# Patient Record
Sex: MALE | Race: WHITE | NOT HISPANIC OR LATINO
[De-identification: names, ages, dates, MRNs, and addresses within clinical notes are randomized per-mention and may not be internally consistent; named-entity substitution may affect disease eponyms.]

---

## 2014-04-07 RX ORDER — MONTELUKAST 4 MG/1
1 TABLET, CHEWABLE ORAL
Qty: 0 | Refills: 0 | COMMUNITY
Start: 2014-04-07

## 2017-03-13 ENCOUNTER — APPOINTMENT (OUTPATIENT)
Dept: PULMONOLOGY | Facility: CLINIC | Age: 72
End: 2017-03-13

## 2017-03-16 ENCOUNTER — RX RENEWAL (OUTPATIENT)
Age: 72
End: 2017-03-16

## 2017-03-30 ENCOUNTER — RX RENEWAL (OUTPATIENT)
Age: 72
End: 2017-03-30

## 2017-04-14 ENCOUNTER — APPOINTMENT (OUTPATIENT)
Dept: PULMONOLOGY | Facility: CLINIC | Age: 72
End: 2017-04-14

## 2017-04-14 VITALS
DIASTOLIC BLOOD PRESSURE: 80 MMHG | WEIGHT: 190 LBS | OXYGEN SATURATION: 96 % | RESPIRATION RATE: 17 BRPM | BODY MASS INDEX: 28.79 KG/M2 | SYSTOLIC BLOOD PRESSURE: 130 MMHG | HEIGHT: 68 IN | HEART RATE: 84 BPM

## 2017-04-17 ENCOUNTER — RX RENEWAL (OUTPATIENT)
Age: 72
End: 2017-04-17

## 2017-04-20 ENCOUNTER — APPOINTMENT (OUTPATIENT)
Dept: OTHER | Facility: CLINIC | Age: 72
End: 2017-04-20

## 2017-04-20 VITALS
DIASTOLIC BLOOD PRESSURE: 84 MMHG | HEART RATE: 63 BPM | SYSTOLIC BLOOD PRESSURE: 169 MMHG | BODY MASS INDEX: 29.7 KG/M2 | HEIGHT: 68 IN | OXYGEN SATURATION: 93 % | WEIGHT: 196 LBS

## 2017-04-20 DIAGNOSIS — Z87.891 PERSONAL HISTORY OF NICOTINE DEPENDENCE: ICD-10-CM

## 2017-04-21 LAB
ALBUMIN SERPL ELPH-MCNC: 4.4 G/DL
ALP BLD-CCNC: 107 U/L
ALT SERPL-CCNC: 23 U/L
ANION GAP SERPL CALC-SCNC: 17 MMOL/L
APPEARANCE: ABNORMAL
AST SERPL-CCNC: 23 U/L
BASOPHILS # BLD AUTO: 0.03 K/UL
BASOPHILS NFR BLD AUTO: 0.4 %
BILIRUB SERPL-MCNC: 0.2 MG/DL
BILIRUBIN URINE: NEGATIVE
BLOOD URINE: NEGATIVE
BUN SERPL-MCNC: 22 MG/DL
CALCIUM SERPL-MCNC: 9.1 MG/DL
CHLORIDE SERPL-SCNC: 101 MMOL/L
CHOLEST SERPL-MCNC: 138 MG/DL
CHOLEST/HDLC SERPL: 3.2 RATIO
CO2 SERPL-SCNC: 23 MMOL/L
COLOR: YELLOW
CREAT SERPL-MCNC: 1 MG/DL
EOSINOPHIL # BLD AUTO: 0.18 K/UL
EOSINOPHIL NFR BLD AUTO: 2.6 %
GLUCOSE QUALITATIVE U: NORMAL MG/DL
GLUCOSE SERPL-MCNC: 118 MG/DL
HCT VFR BLD CALC: 40.9 %
HDLC SERPL-MCNC: 43 MG/DL
HGB BLD-MCNC: 13.8 G/DL
IMM GRANULOCYTES NFR BLD AUTO: 0.1 %
KETONES URINE: NEGATIVE
LDLC SERPL CALC-MCNC: 81 MG/DL
LEUKOCYTE ESTERASE URINE: ABNORMAL
LYMPHOCYTES # BLD AUTO: 1.16 K/UL
LYMPHOCYTES NFR BLD AUTO: 16.7 %
MAN DIFF?: NORMAL
MCHC RBC-ENTMCNC: 31.9 PG
MCHC RBC-ENTMCNC: 33.7 GM/DL
MCV RBC AUTO: 94.7 FL
MONOCYTES # BLD AUTO: 0.52 K/UL
MONOCYTES NFR BLD AUTO: 7.5 %
NEUTROPHILS # BLD AUTO: 5.05 K/UL
NEUTROPHILS NFR BLD AUTO: 72.7 %
NITRITE URINE: NEGATIVE
PH URINE: 6
PLATELET # BLD AUTO: 165 K/UL
POTASSIUM SERPL-SCNC: 4.7 MMOL/L
PROT SERPL-MCNC: 7 G/DL
PROTEIN URINE: NEGATIVE MG/DL
RBC # BLD: 4.32 M/UL
RBC # FLD: 13.9 %
SODIUM SERPL-SCNC: 141 MMOL/L
SPECIFIC GRAVITY URINE: 1.03
TRIGL SERPL-MCNC: 72 MG/DL
UROBILINOGEN URINE: 1 MG/DL
WBC # FLD AUTO: 6.95 K/UL

## 2017-05-26 ENCOUNTER — RX RENEWAL (OUTPATIENT)
Age: 72
End: 2017-05-26

## 2017-06-15 ENCOUNTER — MEDICATION RENEWAL (OUTPATIENT)
Age: 72
End: 2017-06-15

## 2017-06-15 DIAGNOSIS — Z96.659 PRESENCE OF UNSPECIFIED ARTIFICIAL KNEE JOINT: ICD-10-CM

## 2017-06-19 ENCOUNTER — MEDICATION RENEWAL (OUTPATIENT)
Age: 72
End: 2017-06-19

## 2017-09-15 ENCOUNTER — RX RENEWAL (OUTPATIENT)
Age: 72
End: 2017-09-15

## 2017-10-09 ENCOUNTER — RX RENEWAL (OUTPATIENT)
Age: 72
End: 2017-10-09

## 2017-10-13 ENCOUNTER — APPOINTMENT (OUTPATIENT)
Dept: PULMONOLOGY | Facility: CLINIC | Age: 72
End: 2017-10-13
Payer: MEDICARE

## 2017-10-13 VITALS
SYSTOLIC BLOOD PRESSURE: 120 MMHG | HEIGHT: 68 IN | RESPIRATION RATE: 17 BRPM | DIASTOLIC BLOOD PRESSURE: 80 MMHG | HEART RATE: 65 BPM | OXYGEN SATURATION: 96 % | WEIGHT: 193 LBS | BODY MASS INDEX: 29.25 KG/M2

## 2017-10-13 DIAGNOSIS — Z23 ENCOUNTER FOR IMMUNIZATION: ICD-10-CM

## 2017-10-13 PROCEDURE — 99214 OFFICE O/P EST MOD 30 MIN: CPT | Mod: 25

## 2017-10-13 PROCEDURE — G0008: CPT

## 2017-10-13 PROCEDURE — 90662 IIV NO PRSV INCREASED AG IM: CPT

## 2017-10-13 PROCEDURE — 94010 BREATHING CAPACITY TEST: CPT

## 2017-10-13 PROCEDURE — 94729 DIFFUSING CAPACITY: CPT

## 2017-10-13 RX ORDER — AMOXICILLIN 500 MG/1
500 CAPSULE ORAL
Qty: 20 | Refills: 4 | Status: DISCONTINUED | COMMUNITY
Start: 2017-06-19 | End: 2017-10-13

## 2017-10-13 RX ORDER — AMOXICILLIN 500 MG/1
500 CAPSULE ORAL
Qty: 20 | Refills: 4 | Status: DISCONTINUED | COMMUNITY
Start: 2017-04-21 | End: 2017-10-13

## 2017-10-13 RX ORDER — AMLODIPINE BESYLATE 5 MG/1
5 TABLET ORAL
Refills: 0 | Status: ACTIVE | COMMUNITY

## 2017-10-13 RX ORDER — AMOXICILLIN 500 MG/1
500 CAPSULE ORAL
Qty: 20 | Refills: 4 | Status: DISCONTINUED | COMMUNITY
Start: 2017-06-15 | End: 2017-10-13

## 2017-10-30 ENCOUNTER — APPOINTMENT (OUTPATIENT)
Dept: THORACIC SURGERY | Facility: CLINIC | Age: 72
End: 2017-10-30
Payer: MEDICARE

## 2017-10-30 VITALS
HEIGHT: 68 IN | DIASTOLIC BLOOD PRESSURE: 80 MMHG | BODY MASS INDEX: 29.86 KG/M2 | OXYGEN SATURATION: 98 % | SYSTOLIC BLOOD PRESSURE: 184 MMHG | HEART RATE: 61 BPM | WEIGHT: 197 LBS

## 2017-10-30 PROCEDURE — 99205 OFFICE O/P NEW HI 60 MIN: CPT

## 2017-11-06 ENCOUNTER — APPOINTMENT (OUTPATIENT)
Dept: NUCLEAR MEDICINE | Facility: IMAGING CENTER | Age: 72
End: 2017-11-06

## 2017-11-06 ENCOUNTER — OUTPATIENT (OUTPATIENT)
Dept: OUTPATIENT SERVICES | Facility: HOSPITAL | Age: 72
LOS: 1 days | End: 2017-11-06

## 2017-11-06 VITALS
SYSTOLIC BLOOD PRESSURE: 144 MMHG | DIASTOLIC BLOOD PRESSURE: 70 MMHG | HEIGHT: 66.5 IN | WEIGHT: 190.04 LBS | TEMPERATURE: 97 F | HEART RATE: 70 BPM | OXYGEN SATURATION: 98 % | RESPIRATION RATE: 20 BRPM

## 2017-11-06 DIAGNOSIS — R91.1 SOLITARY PULMONARY NODULE: ICD-10-CM

## 2017-11-06 DIAGNOSIS — Z98.89 OTHER SPECIFIED POSTPROCEDURAL STATES: Chronic | ICD-10-CM

## 2017-11-06 DIAGNOSIS — R06.2 WHEEZING: ICD-10-CM

## 2017-11-06 DIAGNOSIS — M12.9 ARTHROPATHY, UNSPECIFIED: Chronic | ICD-10-CM

## 2017-11-06 DIAGNOSIS — I25.10 ATHEROSCLEROTIC HEART DISEASE OF NATIVE CORONARY ARTERY WITHOUT ANGINA PECTORIS: ICD-10-CM

## 2017-11-06 DIAGNOSIS — R91.8 OTHER NONSPECIFIC ABNORMAL FINDING OF LUNG FIELD: ICD-10-CM

## 2017-11-06 DIAGNOSIS — R06.83 SNORING: ICD-10-CM

## 2017-11-06 LAB
BLD GP AB SCN SERPL QL: NEGATIVE — SIGNIFICANT CHANGE UP
BUN SERPL-MCNC: 15 MG/DL — SIGNIFICANT CHANGE UP (ref 7–23)
CALCIUM SERPL-MCNC: 8.9 MG/DL — SIGNIFICANT CHANGE UP (ref 8.4–10.5)
CHLORIDE SERPL-SCNC: 102 MMOL/L — SIGNIFICANT CHANGE UP (ref 98–107)
CO2 SERPL-SCNC: 28 MMOL/L — SIGNIFICANT CHANGE UP (ref 22–31)
CREAT SERPL-MCNC: 0.94 MG/DL — SIGNIFICANT CHANGE UP (ref 0.5–1.3)
GLUCOSE SERPL-MCNC: 119 MG/DL — HIGH (ref 70–99)
HCT VFR BLD CALC: 41.9 % — SIGNIFICANT CHANGE UP (ref 39–50)
HGB BLD-MCNC: 14.6 G/DL — SIGNIFICANT CHANGE UP (ref 13–17)
MCHC RBC-ENTMCNC: 31.3 PG — SIGNIFICANT CHANGE UP (ref 27–34)
MCHC RBC-ENTMCNC: 34.8 % — SIGNIFICANT CHANGE UP (ref 32–36)
MCV RBC AUTO: 89.9 FL — SIGNIFICANT CHANGE UP (ref 80–100)
NRBC # FLD: 0 — SIGNIFICANT CHANGE UP
PLATELET # BLD AUTO: 169 K/UL — SIGNIFICANT CHANGE UP (ref 150–400)
PMV BLD: 12.3 FL — SIGNIFICANT CHANGE UP (ref 7–13)
POTASSIUM SERPL-MCNC: 4 MMOL/L — SIGNIFICANT CHANGE UP (ref 3.5–5.3)
POTASSIUM SERPL-SCNC: 4 MMOL/L — SIGNIFICANT CHANGE UP (ref 3.5–5.3)
RBC # BLD: 4.66 M/UL — SIGNIFICANT CHANGE UP (ref 4.2–5.8)
RBC # FLD: 12.9 % — SIGNIFICANT CHANGE UP (ref 10.3–14.5)
RH IG SCN BLD-IMP: NEGATIVE — SIGNIFICANT CHANGE UP
SODIUM SERPL-SCNC: 140 MMOL/L — SIGNIFICANT CHANGE UP (ref 135–145)
WBC # BLD: 6.25 K/UL — SIGNIFICANT CHANGE UP (ref 3.8–10.5)
WBC # FLD AUTO: 6.25 K/UL — SIGNIFICANT CHANGE UP (ref 3.8–10.5)

## 2017-11-06 RX ORDER — LOSARTAN POTASSIUM 100 MG/1
1 TABLET, FILM COATED ORAL
Qty: 0 | Refills: 0 | COMMUNITY

## 2017-11-06 RX ORDER — FLUTICASONE PROPIONATE AND SALMETEROL 50; 250 UG/1; UG/1
1 POWDER ORAL; RESPIRATORY (INHALATION)
Qty: 0 | Refills: 0 | COMMUNITY

## 2017-11-06 RX ORDER — SERTRALINE 25 MG/1
1 TABLET, FILM COATED ORAL
Qty: 0 | Refills: 0 | COMMUNITY

## 2017-11-06 RX ORDER — AMLODIPINE BESYLATE 2.5 MG/1
1 TABLET ORAL
Qty: 0 | Refills: 0 | COMMUNITY

## 2017-11-06 RX ORDER — OMEPRAZOLE 10 MG/1
1 CAPSULE, DELAYED RELEASE ORAL
Qty: 0 | Refills: 0 | COMMUNITY

## 2017-11-06 NOTE — H&P PST ADULT - PROBLEM SELECTOR PLAN 1
This is a 71 y/o male who is scheduled for left VATS, wedge resection, possible lobectomy on 11-14-17  * Given scrub cleanser  * Given pre op famotidine  * Instructed to start aspirin 81 mg orally in the morning starting 11-6-17 due to h/o cardiac stents  * Instructed to take normal am dose of losartan, omeprazole, sertraline, amlodipine and aspirin the am of surgery

## 2017-11-06 NOTE — H&P PST ADULT - NSANTHOSAYNRD_GEN_A_CORE
No. JEZ screening performed.  STOP BANG Legend: 0-2 = LOW Risk; 3-4 = INTERMEDIATE Risk; 5-8 = HIGH Risk

## 2017-11-06 NOTE — H&P PST ADULT - LYMPHATIC
posterior cervical R/supraclavicular R/anterior cervical L/posterior cervical L/supraclavicular L/anterior cervical R

## 2017-11-06 NOTE — H&P PST ADULT - HISTORY OF PRESENT ILLNESS
This is a 73 y/o male who presents with annual CT scan due to h/o asbestos exposure and patient was a [employer] worker. Abnormality noted on CT scan. Further work up included left VATS, wedge resection, possible lobectomy on 11-14-17 This is a 71 y/o male who presents with annual CT scan due to h/o asbestos exposure and patient was a [employer] [facility] worker. Has h/o lung blebs in 2002 with subsequent pleurodesis Abnormality noted on CT scan. Further work up included left VATS, wedge resection, possible lobectomy on 11-14-17 This is a 71 y/o male who is a very poor historian (if his medications were not in the computer from prior hospitalization, patient would have failed to tell PAST NP that he had been on Advair and montelukast, which he stopped on his own 6-8 months ago and on medication for elevated cholesterol). 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 Has h/o lung blebs in 2002 with subsequent pleurodesis Abnormality noted on CT scan. Further work up included left VATS, wedge resection, possible lobectomy on 11-14-17

## 2017-11-06 NOTE — H&P PST ADULT - CARDIOVASCULAR COMMENTS
can climb stairs without SOB -- 4 METS; However, patient c/o intermittent SOB with weather change ("last winter")

## 2017-11-06 NOTE — H&P PST ADULT - PMH
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

## 2017-11-06 NOTE — H&P PST ADULT - ACTIVITY
can climb stairs without SOB -- NOTE: depending on whether with asthma, c/o intermittent SOB and intermittent wheezing ("last winter") can climb stairs without SOB -- NOTE: depending on weather changes with h/o asthma, patient  c/o intermittent SOB and intermittent wheezing ("last time last winter")

## 2017-11-06 NOTE — H&P PST ADULT - PROBLEM SELECTOR PLAN 3
Await cardiac clearance with cardiologist pre-arranged prior to PAST office  * Await old ekg for comparison, recent stress test and echo from cardiologist Await cardiac clearance with cardiologist pre-arranged prior to PAST office  * Await old ekg for comparison, recent stress test and echo from cardiologist  * Need to notify surgeon of pre op cardiac clearance -- spoke to AYESHA Em in surgeon's office

## 2017-11-06 NOTE — H&P PST ADULT - PSH
Arthropathy  left knee replacement in 2014  Lung Blebs  s/p pleuradesis in 2002  S/P angioplasty with stent  2004 RCA and 2nd stent approximately 2014 or 2015  S/P Hernia Surgery    S/P left knee arthroscopy  2009 Arthropathy  left knee replacement in 2014  Lung Blebs  s/p pleurodesis in 2002  S/P angioplasty with stent  2004 RCA and 2nd stent approximately 2014 or 2015  S/P Hernia Surgery    S/P left knee arthroscopy  2009

## 2017-11-06 NOTE — H&P PST ADULT - OTHER CARE PROVIDERS
pulmonologist, Dr. Martell; cardiologist, Dr. Georges Vergara pulmonologist, Dr. Martell  [phone]; cardiologist, Dr. Georges Vergara--patient to call with phone number

## 2017-11-06 NOTE — H&P PST ADULT - RESPIRATORY AND THORAX COMMENTS
c/o intermittent wheezing ("last winter") and can climb stairs without SOB -- 4 METS; However, patient c/o intermittent SOB with weather change ("last winter"); left lung mass -- stopped advair and montelukast for asthma 6-8 months ago ON HIS OWN and MD DEAN

## 2017-11-06 NOTE — H&P PST ADULT - PROBLEM SELECTOR PLAN 4
Await pulmonary clearance with pulmonologist due to fact that patient c/o intermittent wheezing ("last winter") and can climb stairs without SOB -- 4 METS; However, patient c/o intermittent SOB with weather change ("last winter") -- stopped Advair and montelukast for asthma 6-8 months ago ON HIS OWN and MD UNAWARE  * Need to notify surgeon of pre op pulmonary clearance Await pulmonary clearance with pulmonologist due to fact that patient c/o intermittent wheezing ("last winter") and can climb stairs without SOB -- 4 METS; However, patient c/o intermittent SOB with weather change ("last winter") -- stopped Advair and montelukast for asthma 6-8 months ago ON HIS OWN and MD UNAWARE  (NOTE: patient is a very poor historian (if his medications were not in the computer from prior hospitalization, patient would have failed to tell PAST NP that he had been on Advair and montelukast, which he stopped on his own 6-8 months ago,  and on medication for elevated cholesterol).   * Need to notify surgeon of pre op pulmonary clearance Await pulmonary clearance with pulmonologist due to fact that patient c/o intermittent wheezing ("last winter") and can climb stairs without SOB -- 4 METS; However, patient c/o intermittent SOB with weather change ("last winter") -- stopped Advair and montelukast for asthma 6-8 months ago ON HIS OWN and MD UNAWARE  (NOTE: patient is a very poor historian (if his medications were not in the computer from prior hospitalization, patient would have failed to tell PAST NP that he had been on Advair and montelukast, which he stopped on his own 6-8 months ago,  and on medication for elevated cholesterol).   * Need to notify surgeon of pre op pulmonary clearance--spoke to AYESHA Em in surgeon's office

## 2017-11-10 ENCOUNTER — APPOINTMENT (OUTPATIENT)
Dept: PULMONOLOGY | Facility: CLINIC | Age: 72
End: 2017-11-10

## 2017-11-14 ENCOUNTER — APPOINTMENT (OUTPATIENT)
Dept: THORACIC SURGERY | Facility: HOSPITAL | Age: 72
End: 2017-11-14

## 2017-11-30 ENCOUNTER — OTHER (OUTPATIENT)
Age: 72
End: 2017-11-30

## 2018-01-07 ENCOUNTER — RX RENEWAL (OUTPATIENT)
Age: 73
End: 2018-01-07

## 2018-01-10 ENCOUNTER — RX RENEWAL (OUTPATIENT)
Age: 73
End: 2018-01-10

## 2018-01-19 ENCOUNTER — APPOINTMENT (OUTPATIENT)
Dept: PULMONOLOGY | Facility: CLINIC | Age: 73
End: 2018-01-19
Payer: MEDICARE

## 2018-01-19 VITALS
HEART RATE: 80 BPM | SYSTOLIC BLOOD PRESSURE: 130 MMHG | BODY MASS INDEX: 27.28 KG/M2 | OXYGEN SATURATION: 97 % | HEIGHT: 68 IN | WEIGHT: 180 LBS | DIASTOLIC BLOOD PRESSURE: 80 MMHG

## 2018-01-19 PROCEDURE — 99214 OFFICE O/P EST MOD 30 MIN: CPT | Mod: 25

## 2018-01-19 PROCEDURE — 94010 BREATHING CAPACITY TEST: CPT

## 2018-01-31 ENCOUNTER — RX RENEWAL (OUTPATIENT)
Age: 73
End: 2018-01-31

## 2018-04-02 ENCOUNTER — RX RENEWAL (OUTPATIENT)
Age: 73
End: 2018-04-02

## 2018-04-09 ENCOUNTER — RX RENEWAL (OUTPATIENT)
Age: 73
End: 2018-04-09

## 2018-04-13 ENCOUNTER — APPOINTMENT (OUTPATIENT)
Dept: PULMONOLOGY | Facility: CLINIC | Age: 73
End: 2018-04-13
Payer: COMMERCIAL

## 2018-04-13 VITALS
DIASTOLIC BLOOD PRESSURE: 80 MMHG | HEART RATE: 71 BPM | SYSTOLIC BLOOD PRESSURE: 120 MMHG | BODY MASS INDEX: 28.64 KG/M2 | OXYGEN SATURATION: 97 % | HEIGHT: 68 IN | WEIGHT: 189 LBS | RESPIRATION RATE: 17 BRPM

## 2018-04-13 PROCEDURE — 99214 OFFICE O/P EST MOD 30 MIN: CPT | Mod: 25

## 2018-04-13 PROCEDURE — 94010 BREATHING CAPACITY TEST: CPT

## 2018-04-13 PROCEDURE — 94729 DIFFUSING CAPACITY: CPT

## 2018-04-13 RX ORDER — ALBUTEROL SULFATE 2.5 MG/3ML
(2.5 MG/3ML) SOLUTION RESPIRATORY (INHALATION)
Qty: 120 | Refills: 2 | Status: ACTIVE | COMMUNITY
Start: 2018-04-13 | End: 1900-01-01

## 2018-04-18 ENCOUNTER — APPOINTMENT (OUTPATIENT)
Dept: OTHER | Facility: CLINIC | Age: 73
End: 2018-04-18
Payer: COMMERCIAL

## 2018-04-18 VITALS
SYSTOLIC BLOOD PRESSURE: 163 MMHG | BODY MASS INDEX: 28.79 KG/M2 | OXYGEN SATURATION: 97 % | HEART RATE: 67 BPM | DIASTOLIC BLOOD PRESSURE: 81 MMHG | RESPIRATION RATE: 16 BRPM | WEIGHT: 190 LBS | HEIGHT: 68 IN

## 2018-04-18 VITALS — SYSTOLIC BLOOD PRESSURE: 140 MMHG | DIASTOLIC BLOOD PRESSURE: 60 MMHG

## 2018-04-18 DIAGNOSIS — Z03.89 ENCOUNTER FOR OBSERVATION FOR OTHER SUSPECTED DISEASES AND CONDITIONS RULED OUT: ICD-10-CM

## 2018-04-18 PROCEDURE — 99214 OFFICE O/P EST MOD 30 MIN: CPT | Mod: 25

## 2018-04-18 PROCEDURE — 96150: CPT

## 2018-04-18 PROCEDURE — 94010 BREATHING CAPACITY TEST: CPT

## 2018-04-18 PROCEDURE — 99397 PER PM REEVAL EST PAT 65+ YR: CPT

## 2018-04-18 RX ORDER — OLOPATADINE HYDROCHLORIDE 665 UG/1
0.6 SPRAY, METERED NASAL
Qty: 3 | Refills: 1 | Status: COMPLETED | COMMUNITY
Start: 2017-04-14 | End: 2018-04-18

## 2018-04-18 RX ORDER — MONTELUKAST SODIUM 10 MG/1
10 TABLET, FILM COATED ORAL
Qty: 1 | Refills: 1 | Status: COMPLETED | COMMUNITY
Start: 2017-04-14 | End: 2018-04-18

## 2018-04-18 RX ORDER — MODAFINIL 200 MG/1
200 TABLET ORAL
Qty: 30 | Refills: 5 | Status: COMPLETED | COMMUNITY
Start: 2017-04-14 | End: 2018-04-18

## 2018-04-19 ENCOUNTER — APPOINTMENT (OUTPATIENT)
Dept: PULMONOLOGY | Facility: CLINIC | Age: 73
End: 2018-04-19

## 2018-04-19 LAB
ALBUMIN SERPL ELPH-MCNC: 4.2 G/DL
ALP BLD-CCNC: 116 U/L
ALT SERPL-CCNC: 23 U/L
ANION GAP SERPL CALC-SCNC: 15 MMOL/L
APPEARANCE: CLEAR
AST SERPL-CCNC: 17 U/L
BASOPHILS # BLD AUTO: 0.03 K/UL
BASOPHILS NFR BLD AUTO: 0.4 %
BILIRUB SERPL-MCNC: 0.2 MG/DL
BILIRUBIN URINE: NEGATIVE
BLOOD URINE: NEGATIVE
BUN SERPL-MCNC: 15 MG/DL
CALCIUM SERPL-MCNC: 9.1 MG/DL
CHLORIDE SERPL-SCNC: 104 MMOL/L
CHOLEST SERPL-MCNC: 147 MG/DL
CHOLEST/HDLC SERPL: 3.5 RATIO
CO2 SERPL-SCNC: 26 MMOL/L
COLOR: YELLOW
CREAT SERPL-MCNC: 0.88 MG/DL
EOSINOPHIL # BLD AUTO: 0.23 K/UL
EOSINOPHIL NFR BLD AUTO: 3 %
GLUCOSE QUALITATIVE U: NEGATIVE MG/DL
GLUCOSE SERPL-MCNC: 122 MG/DL
HCT VFR BLD CALC: 42 %
HDLC SERPL-MCNC: 42 MG/DL
HGB BLD-MCNC: 13.5 G/DL
IMM GRANULOCYTES NFR BLD AUTO: 0.1 %
KETONES URINE: NEGATIVE
LDLC SERPL CALC-MCNC: 87 MG/DL
LEUKOCYTE ESTERASE URINE: NEGATIVE
LYMPHOCYTES # BLD AUTO: 1.28 K/UL
LYMPHOCYTES NFR BLD AUTO: 16.9 %
MAN DIFF?: NORMAL
MCHC RBC-ENTMCNC: 30.6 PG
MCHC RBC-ENTMCNC: 32.1 GM/DL
MCV RBC AUTO: 95.2 FL
MONOCYTES # BLD AUTO: 0.71 K/UL
MONOCYTES NFR BLD AUTO: 9.4 %
NEUTROPHILS # BLD AUTO: 5.3 K/UL
NEUTROPHILS NFR BLD AUTO: 70.2 %
NITRITE URINE: NEGATIVE
PH URINE: 6
PLATELET # BLD AUTO: 192 K/UL
POTASSIUM SERPL-SCNC: 5.3 MMOL/L
PROT SERPL-MCNC: 7.4 G/DL
PROTEIN URINE: NEGATIVE MG/DL
RBC # BLD: 4.41 M/UL
RBC # FLD: 13.6 %
SODIUM SERPL-SCNC: 145 MMOL/L
SPECIFIC GRAVITY URINE: 1.01
TRIGL SERPL-MCNC: 92 MG/DL
UROBILINOGEN URINE: NEGATIVE MG/DL
WBC # FLD AUTO: 7.56 K/UL

## 2018-07-10 ENCOUNTER — RX RENEWAL (OUTPATIENT)
Age: 73
End: 2018-07-10

## 2018-07-16 PROBLEM — R06.83 SNORING: Chronic | Status: ACTIVE | Noted: 2017-11-06

## 2018-07-17 ENCOUNTER — APPOINTMENT (OUTPATIENT)
Dept: PULMONOLOGY | Facility: CLINIC | Age: 73
End: 2018-07-17
Payer: MEDICARE

## 2018-07-17 ENCOUNTER — NON-APPOINTMENT (OUTPATIENT)
Age: 73
End: 2018-07-17

## 2018-07-17 VITALS
WEIGHT: 194 LBS | OXYGEN SATURATION: 96 % | DIASTOLIC BLOOD PRESSURE: 80 MMHG | SYSTOLIC BLOOD PRESSURE: 120 MMHG | HEART RATE: 111 BPM | HEIGHT: 68 IN | BODY MASS INDEX: 29.4 KG/M2

## 2018-07-17 PROBLEM — Z77.090 CONTACT WITH AND (SUSPECTED) EXPOSURE TO ASBESTOS: Chronic | Status: ACTIVE | Noted: 2017-11-06

## 2018-07-17 PROBLEM — F32.9 MAJOR DEPRESSIVE DISORDER, SINGLE EPISODE, UNSPECIFIED: Chronic | Status: ACTIVE | Noted: 2017-11-06

## 2018-07-17 PROBLEM — R91.8 OTHER NONSPECIFIC ABNORMAL FINDING OF LUNG FIELD: Chronic | Status: ACTIVE | Noted: 2017-11-06

## 2018-07-17 PROCEDURE — 99214 OFFICE O/P EST MOD 30 MIN: CPT | Mod: 25

## 2018-07-17 PROCEDURE — 94010 BREATHING CAPACITY TEST: CPT | Mod: 59

## 2018-07-17 PROCEDURE — 94618 PULMONARY STRESS TESTING: CPT

## 2018-09-23 ENCOUNTER — RX RENEWAL (OUTPATIENT)
Age: 73
End: 2018-09-23

## 2018-10-12 ENCOUNTER — RX RENEWAL (OUTPATIENT)
Age: 73
End: 2018-10-12

## 2018-10-16 ENCOUNTER — RX RENEWAL (OUTPATIENT)
Age: 73
End: 2018-10-16

## 2018-12-14 ENCOUNTER — APPOINTMENT (OUTPATIENT)
Dept: PULMONOLOGY | Facility: CLINIC | Age: 73
End: 2018-12-14
Payer: MEDICARE

## 2018-12-14 ENCOUNTER — NON-APPOINTMENT (OUTPATIENT)
Age: 73
End: 2018-12-14

## 2018-12-14 VITALS
SYSTOLIC BLOOD PRESSURE: 130 MMHG | OXYGEN SATURATION: 97 % | HEART RATE: 105 BPM | RESPIRATION RATE: 17 BRPM | HEIGHT: 65 IN | WEIGHT: 193 LBS | DIASTOLIC BLOOD PRESSURE: 60 MMHG | BODY MASS INDEX: 32.15 KG/M2

## 2018-12-14 PROCEDURE — 94010 BREATHING CAPACITY TEST: CPT

## 2018-12-14 PROCEDURE — 99214 OFFICE O/P EST MOD 30 MIN: CPT | Mod: 25

## 2018-12-14 NOTE — REASON FOR VISIT
[Follow-Up] : a follow-up visit [FreeTextEntry1] : abnormal chest CT, allergic rhinitis, asbestos exposure, asthma, COPD, esophageal reflux, non-small cell cancer of left lung, JEZ, chronic rhinitis and SOB

## 2018-12-14 NOTE — HISTORY OF PRESENT ILLNESS
[FreeTextEntry1] : Mr. St is a 73 year old male with a history of abnormal chest CT, allergic rhinitis, asbestos exposure, asthma, COPD, esophageal reflux, non-small cell cancer of left lung, JEZ, chronic rhinitis and SOB presenting to the office today for a follow up visit. His chief complaint is sinus issues. \par - He comes in stating that he not been good. He has been experiencing dysphonia for the past 3 months \par - His head has been "clogged" and he has been having nasal congestion\par - He has itchy eyes\par - He has been getting between 7-9 hours of sleep.\par - His weight has been stable\par - He has not been exercising as much as he would like\par - he believes that his congestion was brought on by riding his bike outside\par - He notes wheezing sometimes. \par - His energy level has been medium to low. \par - he was placed on 20 mg of prednisone for 7 days \par - He notes that ever since his prior ear issues and receiving treatment, he has been having a clogged ear. \par - He denies any headaches, nausea, vomiting, fever, chills, sweats, chest pain, chest pressure, palpitations, SOB, coughing, fatigue, diarrhea, constipation, dysphagia, myalgias, dizziness, leg swelling, leg pain, itchy ears, heartburn, reflux, or sour taste in the mouth.

## 2018-12-14 NOTE — ADDENDUM
[FreeTextEntry1] : Documented by Kip Zhao acting as a scribe for Dr. Vitor Martell on 12/14/18\par \par All medical record entries made by the Scribe were at my, Dr. Vitor Martell's, direction and personally dictated by me on 12/14/18. I have reviewed the chart and agree that the record accurately reflects my personal performance of the history, physical exam, assessment and plan. I have also personally directed, reviewed, and agree with the discharge instructions. \par \par \par \par \par

## 2018-12-14 NOTE — PROCEDURE
[FreeTextEntry1] : He had a CT chest scan performed on 7/26/2018, which showed that the previously seen left lower lobe nodule has been resected and that there are postoperative changes. There is a mild degree of emphysema. There is minimal apical pulmonary scarring, worse on the right; atelectasis and scarring in the left lower lobe. There is left pleural thickening. \par \par PFT- spi reveals mild obstructive dysfunction; FEV1 was 2.15L which is 83% of predicted; normal flow volume loop \par

## 2018-12-14 NOTE — ASSESSMENT
[FreeTextEntry1] : Mr. St is s/p lung resection c/w primary lung cancer, likely asbestos related. He is currently suffering from ENT/ sinus/ throat issues. \par \par His SOB is multifactorial:\par -overweight \par -asthma \par -COPD \par -asbestos exposure \par -cardiac disease \par -poor mechanics of breathing\par -s/p lobectomy \par \par Problem 1: lung cancer\par -s/p lung resection c/w primary lung cancer\par -likely asbestos related\par -f/u chest CT 4 times/year [id number], 2 times/year x3 years (due now, then 3-4 months) \par -f/u chest CT March 2019\par \par Problem 2: asthma\par -continue Albuterol via nebulizer Q6H\par -continue Advair 250 BID\par -continue Combivent PRN Q6H\par - Continue Singulair 10 mg QD at bed\par \par -Inhaler technique reviewed as well as oral hygiene techniques reviewed with patient. Avoidance of cold air, extremes of temperature, rescue inhaler should be used before exercise. Order of medication reviewed with patient. Recommended use of a cool mist humidifier in the bedroom. \par \par Asthma is believed to be caused by inherited (genetic) and environmental factor, but its exact cause is unknown. Asthma may be triggered by allergens, lung infections, or irritants in the air. Asthma triggers are different for each person.\par \par Problem 3: GERD\par -add Zantac 300 QHS\par -continue Omeprazole 40 mg QD in the morning \par -Things to avoid including overeating, spicy foods, tight clothing, eating within three hours of bed, this list is not all inclusive. \par -For treatment of reflux, possible options discussed including diet control, H2 blockers, PPIs, as well as coating motility agents discussed as treatment options. Timing of meals and proximity of last meal to sleep were discussed. If symptoms persist, a formal gastrointestinal evaluation is needed.\par \par Problem : Sinusitis\par - Add Augmentin 875 mg BID\par - Add Qnasal \par - Add Nystatin 10 mg q8H\par \par Problem 4: allergy/sinus\par -continue Clarinex 5mg QHS\par -continue olopatadine 0.6% 1 sniff each nostril BID\par \par Environmental measures for allergies were encouraged including mattress and pillow cover, air purifier, and environmental controls.\par \par Problem 5: overweight\par -Weight loss, exercise, and diet control were discussed and are highly encouraged. Treatment options were given such as, aqua therapy, and contacting a nutritionist. Recommended to use the elliptical, stationary bike, less use of treadmill.  Obesity is associated with worsening asthma, shortness of breath, and potential for cardiac disease, diabetes, and other underlying medical conditions. \par \par Problem 6: OSAS\par -recommended Oxy-Aid\par -Oral appliance fitting due him not tolerating the face mask. F/u sleep study with the appliance (Home) \par -continue Provigil 200 mg QD (ISTOP Checked)\par -Discussed the risks/associations with coronary artery disease, atrial fibrillation, arrhythmia, memory loss, issues with concentration, stroke risk, hypertension, nocturia, chronic reflux/Bray’s esophagus some but not all inclusive. Treatment options discussed including CPAP/BiPAP machine, oral appliance, ProVent therapy, Oxy-Aid by [employer], new technologies, or positional sleep. \par \par Problem 7: health maintenance\par -received influenza vaccine 2018\par -recommended strep pneumonia vaccines: Prevnar-13 vaccine, followed by Pneumo vaccine 23 on year following\par -recommended early intervention for URIs\par -recommended osteoporosis evaluations\par -recommended early dermatological evaluations\par -recommended after the age of 50 to the age of 70, colonoscopy every 5 years\par \par Follow up in 3-4 months.\par The patient was encouraged to call with any changes, concerns, or questions.

## 2018-12-18 ENCOUNTER — FORM ENCOUNTER (OUTPATIENT)
Age: 73
End: 2018-12-18

## 2018-12-21 ENCOUNTER — APPOINTMENT (OUTPATIENT)
Dept: PSYCHIATRY | Facility: CLINIC | Age: 73
End: 2018-12-21
Payer: COMMERCIAL

## 2018-12-21 PROCEDURE — 99205 OFFICE O/P NEW HI 60 MIN: CPT

## 2019-02-25 ENCOUNTER — RX RENEWAL (OUTPATIENT)
Age: 74
End: 2019-02-25

## 2019-03-15 ENCOUNTER — APPOINTMENT (OUTPATIENT)
Dept: PSYCHIATRY | Facility: CLINIC | Age: 74
End: 2019-03-15
Payer: COMMERCIAL

## 2019-03-15 PROCEDURE — 99214 OFFICE O/P EST MOD 30 MIN: CPT

## 2019-04-15 ENCOUNTER — APPOINTMENT (OUTPATIENT)
Dept: PULMONOLOGY | Facility: CLINIC | Age: 74
End: 2019-04-15
Payer: MEDICARE

## 2019-04-15 VITALS
RESPIRATION RATE: 16 BRPM | BODY MASS INDEX: 32.65 KG/M2 | OXYGEN SATURATION: 97 % | DIASTOLIC BLOOD PRESSURE: 60 MMHG | WEIGHT: 196 LBS | SYSTOLIC BLOOD PRESSURE: 140 MMHG | HEIGHT: 65 IN | HEART RATE: 69 BPM

## 2019-04-15 PROCEDURE — 94010 BREATHING CAPACITY TEST: CPT

## 2019-04-15 PROCEDURE — 99214 OFFICE O/P EST MOD 30 MIN: CPT | Mod: 25

## 2019-04-15 PROCEDURE — 94729 DIFFUSING CAPACITY: CPT

## 2019-04-15 NOTE — PROCEDURE
[FreeTextEntry1] : PFT- spi reveals mild to moderate obstructive dysfunction; FEV1 was 2.31L which is 79% of predicted; normal diffusion at 30.3, which is 145% of predicted; normal flow volume loop

## 2019-04-15 NOTE — ADDENDUM
[FreeTextEntry1] : Documented by Kip Zhao acting as a scribe for Dr. Vitor Martell on 4/15/2019\par \par All medical record entries made by the Scribe were at my, Dr. Vitor Martell's, direction and personally dictated by me on 4/15/2019. I have reviewed the chart and agree that the record accurately reflects my personal performance of the history, physical exam, assessment and plan. I have also personally directed, reviewed, and agree with the discharge instructions. \par \par \par \par \par

## 2019-04-15 NOTE — HISTORY OF PRESENT ILLNESS
[FreeTextEntry1] : Mr. St is a 73 year old male with a history of abnormal chest CT, allergic rhinitis, asbestos exposure, asthma, COPD, esophageal reflux, non-small cell cancer of left lung, JEZ, chronic rhinitis and SOB presenting to the office today for a follow up visit. His chief complaint is low energy\par - He comes in stating that he feels generally well \par - He describes his energy level as medium\par - While he does not have diarrhea, he has been having loose stool for the last year. \par - his energy level is 6/10\par - He has been having a lot of itchy ears (inside the ear)\par - He has been tying to go the gym up to 3 times per week. \par - He states that he sleeps well. \par - He denies any headaches, nausea, vomiting, fever, chills, sweats, chest pain, chest pressure, palpitations, SOB, coughing, wheezing, diarrhea, constipation, dysphagia, myalgias, dizziness, leg swelling, leg pain, itchy eyes, itchy ears, heartburn, reflux, or sour taste in the mouth.

## 2019-04-15 NOTE — PHYSICAL EXAM
[Normal Appearance] : normal appearance [General Appearance - Well Developed] : well developed [Well Groomed] : well groomed [General Appearance - Well Nourished] : well nourished [No Deformities] : no deformities [General Appearance - In No Acute Distress] : no acute distress [Normal Conjunctiva] : the conjunctiva exhibited no abnormalities [Eyelids - No Xanthelasma] : the eyelids demonstrated no xanthelasmas [Normal Oropharynx] : normal oropharynx [Neck Cervical Mass (___cm)] : no neck mass was observed [Neck Appearance] : the appearance of the neck was normal [Jugular Venous Distention Increased] : there was no jugular-venous distention [Thyroid Diffuse Enlargement] : the thyroid was not enlarged [Thyroid Nodule] : there were no palpable thyroid nodules [Heart Rate And Rhythm] : heart rate and rhythm were normal [Heart Sounds] : normal S1 and S2 [Murmurs] : no murmurs present [Respiration, Rhythm And Depth] : normal respiratory rhythm and effort [Auscultation Breath Sounds / Voice Sounds] : lungs were clear to auscultation bilaterally [Exaggerated Use Of Accessory Muscles For Inspiration] : no accessory muscle use [Abdomen Tenderness] : non-tender [Abdomen Soft] : soft [Abdomen Mass (___ Cm)] : no abdominal mass palpated [Abnormal Walk] : normal gait [Gait - Sufficient For Exercise Testing] : the gait was sufficient for exercise testing [Nail Clubbing] : no clubbing of the fingernails [Cyanosis, Localized] : no localized cyanosis [Petechial Hemorrhages (___cm)] : no petechial hemorrhages [Skin Color & Pigmentation] : normal skin color and pigmentation [] : no rash [No Venous Stasis] : no venous stasis [Skin Lesions] : no skin lesions [No Xanthoma] : no  xanthoma was observed [No Skin Ulcers] : no skin ulcer [Deep Tendon Reflexes (DTR)] : deep tendon reflexes were 2+ and symmetric [Sensation] : the sensory exam was normal to light touch and pinprick [Oriented To Time, Place, And Person] : oriented to person, place, and time [No Focal Deficits] : no focal deficits [Affect] : the affect was normal [Impaired Insight] : insight and judgment were intact [III] : III [FreeTextEntry1] : I:E ratio 1:3; clear

## 2019-04-15 NOTE — ASSESSMENT
[FreeTextEntry1] : Mr. St has a history of allergy, GERD, OSAS, and is s/p lung resection c/w primary lung cancer, likely asbestos related. He is currently stable from a pulmonary perspective. \par \par His SOB is multifactorial:\par -overweight \par -asthma \par -COPD \par -asbestos exposure \par -cardiac disease \par -poor mechanics of breathing\par -s/p lobectomy \par \par Problem 1: lung cancer\par -s/p lung resection c/w primary lung cancer\par -likely asbestos related\par -f/u chest CT 4 times/year [id number], 2 times/year x3 years (due now, then 3-4 months) \par -f/u chest CT 5/2019 (next)\par \par Problem 2: asthma\par -continue Albuterol via nebulizer Q6H\par -continue Advair 250 BID\par -continue Combivent PRN Q6H\par - Continue Singulair 10 mg QD at bed\par \par -Inhaler technique reviewed as well as oral hygiene techniques reviewed with patient. Avoidance of cold air, extremes of temperature, rescue inhaler should be used before exercise. Order of medication reviewed with patient. Recommended use of a cool mist humidifier in the bedroom. \par \par Asthma is believed to be caused by inherited (genetic) and environmental factor, but its exact cause is unknown. Asthma may be triggered by allergens, lung infections, or irritants in the air. Asthma triggers are different for each person.\par \par Problem 3: GERD\par - Continue Zantac 300 QHS\par -continue Omeprazole 40 mg QD in the morning \par -Things to avoid including overeating, spicy foods, tight clothing, eating within three hours of bed, this list is not all inclusive. \par -For treatment of reflux, possible options discussed including diet control, H2 blockers, PPIs, as well as coating motility agents discussed as treatment options. Timing of meals and proximity of last meal to sleep were discussed. If symptoms persist, a formal gastrointestinal evaluation is needed.\par \par Problem 4: allergy/sinus\par -continue Clarinex 5mg QHS\par -continue olopatadine 0.6% 1 sniff each nostril BID\par \par Environmental measures for allergies were encouraged including mattress and pillow cover, air purifier, and environmental controls.\par \par Problem 5: overweight\par -Weight loss, exercise, and diet control were discussed and are highly encouraged. Treatment options were given such as, aqua therapy, and contacting a nutritionist. Recommended to use the elliptical, stationary bike, less use of treadmill.  Obesity is associated with worsening asthma, shortness of breath, and potential for cardiac disease, diabetes, and other underlying medical conditions. \par \par Problem 6: OSAS\par -recommended Oxy-Aid\par -Oral appliance fitting due him not tolerating the face mask. F/u sleep study with the appliance (Home) \par -continue Provigil 200 mg QD (ISTOP Checked)\par -Discussed the risks/associations with coronary artery disease, atrial fibrillation, arrhythmia, memory loss, issues with concentration, stroke risk, hypertension, nocturia, chronic reflux/Bray’s esophagus some but not all inclusive. Treatment options discussed including CPAP/BiPAP machine, oral appliance, ProVent therapy, Oxy-Aid by [employer], new technologies, or positional sleep. \par \par Problem 7: health maintenance\par -received influenza vaccine 2018\par -recommended strep pneumonia vaccines: Prevnar-13 vaccine, followed by Pneumo vaccine 23 on year following\par -recommended early intervention for URIs\par -recommended osteoporosis evaluations\par -recommended early dermatological evaluations\par -recommended after the age of 50 to the age of 70, colonoscopy every 5 years\par \par Follow up in 3-4 months.\par The patient was encouraged to call with any changes, concerns, or questions.

## 2019-05-15 ENCOUNTER — APPOINTMENT (OUTPATIENT)
Dept: OTHER | Facility: CLINIC | Age: 74
End: 2019-05-15
Payer: COMMERCIAL

## 2019-05-15 VITALS
HEART RATE: 82 BPM | SYSTOLIC BLOOD PRESSURE: 146 MMHG | DIASTOLIC BLOOD PRESSURE: 67 MMHG | BODY MASS INDEX: 28.79 KG/M2 | WEIGHT: 190 LBS | HEIGHT: 68 IN

## 2019-05-15 DIAGNOSIS — Z85.118 PERSONAL HISTORY OF OTHER MALIGNANT NEOPLASM OF BRONCHUS AND LUNG: ICD-10-CM

## 2019-05-15 PROCEDURE — 99214 OFFICE O/P EST MOD 30 MIN: CPT | Mod: 25

## 2019-05-15 PROCEDURE — 99396 PREV VISIT EST AGE 40-64: CPT | Mod: 25

## 2019-05-15 PROCEDURE — 94010 BREATHING CAPACITY TEST: CPT

## 2019-05-15 RX ORDER — ASPIRIN 81 MG/1
81 TABLET, CHEWABLE ORAL
Refills: 0 | Status: COMPLETED | COMMUNITY
End: 2019-05-15

## 2019-05-15 RX ORDER — AMOXICILLIN AND CLAVULANATE POTASSIUM 875; 125 MG/1; MG/1
875-125 TABLET, COATED ORAL
Qty: 28 | Refills: 0 | Status: COMPLETED | COMMUNITY
Start: 2018-12-14 | End: 2019-05-15

## 2019-05-15 RX ORDER — OLOPATADINE HYDROCHLORIDE 665 UG/1
0.6 SPRAY, METERED NASAL
Qty: 3 | Refills: 1 | Status: COMPLETED | COMMUNITY
Start: 2018-04-13 | End: 2019-05-15

## 2019-05-15 RX ORDER — MODAFINIL 200 MG/1
200 TABLET ORAL
Qty: 30 | Refills: 5 | Status: COMPLETED | COMMUNITY
Start: 2018-04-13 | End: 2019-05-15

## 2019-05-15 RX ORDER — ASPIRIN 81 MG
81 TABLET,CHEWABLE ORAL
Refills: 0 | Status: COMPLETED | COMMUNITY
End: 2019-05-15

## 2019-05-15 RX ORDER — PRASUGREL HYDROCHLORIDE 5 MG/1
5 TABLET, COATED ORAL
Refills: 0 | Status: COMPLETED | COMMUNITY
End: 2019-05-15

## 2019-05-15 RX ORDER — MODAFINIL 200 MG/1
200 TABLET ORAL
Qty: 30 | Refills: 5 | Status: COMPLETED | COMMUNITY
Start: 2018-05-22 | End: 2019-05-15

## 2019-05-17 LAB
ALBUMIN SERPL ELPH-MCNC: 4.2 G/DL
ALP BLD-CCNC: 84 U/L
ALT SERPL-CCNC: 24 U/L
ANION GAP SERPL CALC-SCNC: 15 MMOL/L
APPEARANCE: CLEAR
AST SERPL-CCNC: 22 U/L
BACTERIA: NEGATIVE
BASOPHILS # BLD AUTO: 0.03 K/UL
BASOPHILS NFR BLD AUTO: 0.5 %
BILIRUB SERPL-MCNC: 0.2 MG/DL
BILIRUBIN URINE: NEGATIVE
BLOOD URINE: NEGATIVE
BUN SERPL-MCNC: 20 MG/DL
CALCIUM SERPL-MCNC: 9.2 MG/DL
CHLORIDE SERPL-SCNC: 106 MMOL/L
CHOLEST SERPL-MCNC: 122 MG/DL
CHOLEST/HDLC SERPL: 3.5 RATIO
CO2 SERPL-SCNC: 21 MMOL/L
COLOR: NORMAL
CREAT SERPL-MCNC: 0.93 MG/DL
EOSINOPHIL # BLD AUTO: 0.2 K/UL
EOSINOPHIL NFR BLD AUTO: 3.4 %
GLUCOSE QUALITATIVE U: NEGATIVE
GLUCOSE SERPL-MCNC: 118 MG/DL
HCT VFR BLD CALC: 43.7 %
HDLC SERPL-MCNC: 35 MG/DL
HGB BLD-MCNC: 14.2 G/DL
HYALINE CASTS: 0 /LPF
IMM GRANULOCYTES NFR BLD AUTO: 0.2 %
KETONES URINE: NEGATIVE
LDLC SERPL CALC-MCNC: 69 MG/DL
LEUKOCYTE ESTERASE URINE: NEGATIVE
LYMPHOCYTES # BLD AUTO: 1.15 K/UL
LYMPHOCYTES NFR BLD AUTO: 19.4 %
MAN DIFF?: NORMAL
MCHC RBC-ENTMCNC: 31.3 PG
MCHC RBC-ENTMCNC: 32.5 GM/DL
MCV RBC AUTO: 96.5 FL
MICROSCOPIC-UA: NORMAL
MONOCYTES # BLD AUTO: 0.38 K/UL
MONOCYTES NFR BLD AUTO: 6.4 %
NEUTROPHILS # BLD AUTO: 4.16 K/UL
NEUTROPHILS NFR BLD AUTO: 70.1 %
NITRITE URINE: NEGATIVE
PH URINE: 6
PLATELET # BLD AUTO: 184 K/UL
POTASSIUM SERPL-SCNC: 4.3 MMOL/L
PROT SERPL-MCNC: 6.5 G/DL
PROTEIN URINE: NEGATIVE
RBC # BLD: 4.53 M/UL
RBC # FLD: 13.3 %
RED BLOOD CELLS URINE: 1 /HPF
SODIUM SERPL-SCNC: 142 MMOL/L
SPECIFIC GRAVITY URINE: 1.02
SQUAMOUS EPITHELIAL CELLS: 0 /HPF
TRIGL SERPL-MCNC: 89 MG/DL
UROBILINOGEN URINE: NORMAL
WBC # FLD AUTO: 5.93 K/UL
WHITE BLOOD CELLS URINE: 2 /HPF

## 2019-06-01 ENCOUNTER — RX RENEWAL (OUTPATIENT)
Age: 74
End: 2019-06-01

## 2019-06-10 ENCOUNTER — RX RENEWAL (OUTPATIENT)
Age: 74
End: 2019-06-10

## 2019-06-10 RX ORDER — SALINE NASAL SPRAY 1.5 OZ
0.65 SOLUTION NASAL
Qty: 1 | Refills: 5 | Status: ACTIVE | COMMUNITY
Start: 2019-06-10 | End: 1900-01-01

## 2019-06-14 ENCOUNTER — APPOINTMENT (OUTPATIENT)
Dept: PSYCHIATRY | Facility: CLINIC | Age: 74
End: 2019-06-14
Payer: COMMERCIAL

## 2019-06-14 PROCEDURE — 99214 OFFICE O/P EST MOD 30 MIN: CPT

## 2019-06-17 ENCOUNTER — LABORATORY RESULT (OUTPATIENT)
Age: 74
End: 2019-06-17

## 2019-06-17 ENCOUNTER — APPOINTMENT (OUTPATIENT)
Dept: DERMATOLOGY | Facility: CLINIC | Age: 74
End: 2019-06-17
Payer: COMMERCIAL

## 2019-06-17 VITALS
SYSTOLIC BLOOD PRESSURE: 140 MMHG | HEIGHT: 68 IN | DIASTOLIC BLOOD PRESSURE: 78 MMHG | WEIGHT: 190 LBS | BODY MASS INDEX: 28.79 KG/M2

## 2019-06-17 DIAGNOSIS — D22.9 MELANOCYTIC NEVI, UNSPECIFIED: ICD-10-CM

## 2019-06-17 DIAGNOSIS — L81.4 OTHER MELANIN HYPERPIGMENTATION: ICD-10-CM

## 2019-06-17 DIAGNOSIS — C44.320 SQUAMOUS CELL CARCINOMA OF SKIN OF UNSPECIFIED PARTS OF FACE: ICD-10-CM

## 2019-06-17 DIAGNOSIS — Z85.41 PERSONAL HISTORY OF MALIGNANT NEOPLASM OF CERVIX UTERI: ICD-10-CM

## 2019-06-17 DIAGNOSIS — B35.3 TINEA PEDIS: ICD-10-CM

## 2019-06-17 DIAGNOSIS — Z85.89 PERSONAL HISTORY OF MALIGNANT NEOPLASM OF OTHER ORGANS AND SYSTEMS: ICD-10-CM

## 2019-06-17 PROCEDURE — 17000 DESTRUCT PREMALG LESION: CPT | Mod: 59

## 2019-06-17 PROCEDURE — 11102 TANGNTL BX SKIN SINGLE LES: CPT

## 2019-06-17 PROCEDURE — 17003 DESTRUCT PREMALG LES 2-14: CPT

## 2019-06-17 PROCEDURE — 99204 OFFICE O/P NEW MOD 45 MIN: CPT | Mod: 25

## 2019-06-18 PROBLEM — C44.320 SCC (SQUAMOUS CELL CARCINOMA), FACE: Status: RESOLVED | Noted: 2019-06-18 | Resolved: 2019-06-18

## 2019-06-18 PROBLEM — Z85.89 HISTORY OF SQUAMOUS CELL CARCINOMA: Status: RESOLVED | Noted: 2019-06-18 | Resolved: 2019-06-18

## 2019-07-02 ENCOUNTER — APPOINTMENT (OUTPATIENT)
Dept: GASTROENTEROLOGY | Facility: CLINIC | Age: 74
End: 2019-07-02

## 2019-07-09 ENCOUNTER — APPOINTMENT (OUTPATIENT)
Dept: GASTROENTEROLOGY | Facility: CLINIC | Age: 74
End: 2019-07-09

## 2019-08-05 ENCOUNTER — APPOINTMENT (OUTPATIENT)
Dept: DERMATOLOGY | Facility: CLINIC | Age: 74
End: 2019-08-05

## 2019-09-16 ENCOUNTER — MEDICATION RENEWAL (OUTPATIENT)
Age: 74
End: 2019-09-16

## 2019-09-16 RX ORDER — FLUTICASONE PROPIONATE 100 UG/1
100 POWDER, METERED RESPIRATORY (INHALATION)
Qty: 3 | Refills: 1 | Status: ACTIVE | COMMUNITY
Start: 2018-04-13 | End: 1900-01-01

## 2019-09-16 RX ORDER — DESLORATADINE 5 MG/1
5 TABLET ORAL
Qty: 90 | Refills: 1 | Status: ACTIVE | COMMUNITY
Start: 2018-04-13 | End: 1900-01-01

## 2019-09-20 ENCOUNTER — APPOINTMENT (OUTPATIENT)
Dept: PSYCHIATRY | Facility: CLINIC | Age: 74
End: 2019-09-20
Payer: COMMERCIAL

## 2019-09-20 PROCEDURE — 99214 OFFICE O/P EST MOD 30 MIN: CPT

## 2019-09-24 ENCOUNTER — APPOINTMENT (OUTPATIENT)
Dept: PULMONOLOGY | Facility: CLINIC | Age: 74
End: 2019-09-24
Payer: MEDICARE

## 2019-09-24 VITALS
SYSTOLIC BLOOD PRESSURE: 160 MMHG | OXYGEN SATURATION: 96 % | HEIGHT: 67 IN | WEIGHT: 184 LBS | BODY MASS INDEX: 28.88 KG/M2 | DIASTOLIC BLOOD PRESSURE: 50 MMHG | HEART RATE: 85 BPM | RESPIRATION RATE: 17 BRPM

## 2019-09-24 PROCEDURE — 99214 OFFICE O/P EST MOD 30 MIN: CPT | Mod: 25

## 2019-09-24 PROCEDURE — 94010 BREATHING CAPACITY TEST: CPT

## 2019-09-24 PROCEDURE — 94618 PULMONARY STRESS TESTING: CPT

## 2019-09-24 PROCEDURE — 94729 DIFFUSING CAPACITY: CPT

## 2019-09-24 PROCEDURE — ZZZZZ: CPT

## 2019-09-24 RX ORDER — NYSTATIN 100000 [USP'U]/ML
100000 SUSPENSION ORAL
Qty: 1 | Refills: 2 | Status: DISCONTINUED | COMMUNITY
Start: 2018-12-14 | End: 2019-09-24

## 2019-09-24 NOTE — ASSESSMENT
[FreeTextEntry1] : Mr. St has a history of allergy, GERD, OSAS, asthma, HTN, and is s/p lung resection c/w primary lung cancer (12/2016), likely asbestos related. He is currently stable from a pulmonary perspective. \par \par His SOB is multifactorial:\par -overweight \par -asthma \par -COPD \par -asbestos exposure \par -cardiac disease \par -poor mechanics of breathing\par -s/p lobectomy \par \par Problem 1: lung cancer -(12/2016)\par -s/p lung resection c/w primary lung cancer\par -likely asbestos related\par -f/u chest CT 4 times/year x2 years, 2 times/year x3 years (due now, then 3-4 months) \par -f/u chest CT 5/2020 \par \par Problem 2: asthma\par -continue Albuterol via nebulizer Q6H\par -continue Advair 250 1 inhalation BID \par -continue Combivent PRN Q6H\par -continue Singulair 10 mg QD at bed\par \par -Inhaler technique reviewed as well as oral hygiene techniques reviewed with patient. Avoidance of cold air, extremes of temperature, rescue inhaler should be used before exercise. Order of medication reviewed with patient. Recommended use of a cool mist humidifier in the bedroom. \par -Asthma is believed to be caused by inherited (genetic) and environmental factor, but its exact cause is unknown. Asthma may be triggered by allergens, lung infections, or irritants in the air. Asthma triggers are different for each person.\par \par Problem 3: GERD\par -continue Omeprazole 20 mg QOD in the morning \par -Things to avoid including overeating, spicy foods, tight clothing, eating within three hours of bed, this list is not all inclusive. \par -For treatment of reflux, possible options discussed including diet control, H2 blockers, PPIs, as well as coating motility agents discussed as treatment options. Timing of meals and proximity of last meal to sleep were discussed. If symptoms persist, a formal gastrointestinal evaluation is needed.\par \par Problem 4: allergy/sinus\par -continue Clarinex 5 mg QHS\par -continue olopatadine 0.6% 1 sniff each nostril BID\par Environmental measures for allergies were encouraged including mattress and pillow cover, air purifier, and environmental controls.\par \par Problem 5: overweight -(needs improvement)\par -Weight loss, exercise, and diet control were discussed and are highly encouraged. Treatment options were given such as, aqua therapy, and contacting a nutritionist. Recommended to use the elliptical, stationary bike, less use of treadmill.  Obesity is associated with worsening asthma, shortness of breath, and potential for cardiac disease, diabetes, and other underlying medical conditions. \par \par Problem 6: OSAS\par -recommended to use "Oxy Aid" \par -Oral appliance fitting due him not tolerating the face mask. F/u sleep study with the appliance (Home) \par -continue Provigil 200 mg QD (ISTOP Checked)\par -Discussed the risks/associations with coronary artery disease, atrial fibrillation, arrhythmia, memory loss, issues with concentration, stroke risk, hypertension, nocturia, chronic reflux/Bray’s esophagus some but not all inclusive. Treatment options discussed including CPAP/BiPAP machine, oral appliance, ProVent therapy, Oxy-Aid by [employer], new technologies, or positional sleep. \par \par Problem 7: health maintenance\par -received influenza vaccine 2018\par -recommended strep pneumonia vaccines: Prevnar-13 vaccine, followed by Pneumo vaccine 23 on year following\par -recommended early intervention for URIs\par -recommended osteoporosis evaluations\par -recommended early dermatological evaluations\par -recommended after the age of 50 to the age of 70, colonoscopy every 5 years\par \par Follow up in 3-4 months - SPI / DLCO \par The patient was encouraged to call with any changes, concerns, or questions.

## 2019-09-24 NOTE — PROCEDURE
[FreeTextEntry1] : PFT - spi reveals mild-moderate obstructive dysfunction at mid-low lung volumes; FEV1 is 2.41 which is 83% of predicted, normal flow volume loop. Normal diffusion, DLCO is 20.7 which is 102% of predicted. \par \par 6 minute walk test reveals a low saturation of 94% with no evidence of dyspnea or fatigue; walked 668.3 meters

## 2019-09-24 NOTE — PHYSICAL EXAM
[Normal Appearance] : normal appearance [General Appearance - Well Developed] : well developed [Well Groomed] : well groomed [General Appearance - Well Nourished] : well nourished [No Deformities] : no deformities [General Appearance - In No Acute Distress] : no acute distress [Normal Conjunctiva] : the conjunctiva exhibited no abnormalities [Eyelids - No Xanthelasma] : the eyelids demonstrated no xanthelasmas [Normal Oropharynx] : normal oropharynx [Neck Cervical Mass (___cm)] : no neck mass was observed [Neck Appearance] : the appearance of the neck was normal [Jugular Venous Distention Increased] : there was no jugular-venous distention [Thyroid Diffuse Enlargement] : the thyroid was not enlarged [Thyroid Nodule] : there were no palpable thyroid nodules [Heart Rate And Rhythm] : heart rate and rhythm were normal [Heart Sounds] : normal S1 and S2 [Murmurs] : no murmurs present [Respiration, Rhythm And Depth] : normal respiratory rhythm and effort [Exaggerated Use Of Accessory Muscles For Inspiration] : no accessory muscle use [Auscultation Breath Sounds / Voice Sounds] : lungs were clear to auscultation bilaterally [Abdomen Soft] : soft [Abdomen Tenderness] : non-tender [Abdomen Mass (___ Cm)] : no abdominal mass palpated [Abnormal Walk] : normal gait [Gait - Sufficient For Exercise Testing] : the gait was sufficient for exercise testing [Nail Clubbing] : no clubbing of the fingernails [Cyanosis, Localized] : no localized cyanosis [Petechial Hemorrhages (___cm)] : no petechial hemorrhages [Skin Color & Pigmentation] : normal skin color and pigmentation [] : no rash [No Venous Stasis] : no venous stasis [No Skin Ulcers] : no skin ulcer [Skin Lesions] : no skin lesions [No Xanthoma] : no  xanthoma was observed [Deep Tendon Reflexes (DTR)] : deep tendon reflexes were 2+ and symmetric [No Focal Deficits] : no focal deficits [Sensation] : the sensory exam was normal to light touch and pinprick [Oriented To Time, Place, And Person] : oriented to person, place, and time [Impaired Insight] : insight and judgment were intact [Affect] : the affect was normal [II] : II [FreeTextEntry1] : I:E ratio 1:3; clear

## 2019-09-24 NOTE — ADDENDUM
[FreeTextEntry1] : All medical record entries made by rg Matthews were at Dr. Vitor Martell's direction and personally dictated by me on 09/24/2019. I have reviewed the chart and agree that the record accurately reflects my personal performance of the history, physical exam, assessment and plan. I have also personally directed, reviewed, and agree with the discharge instructions. \par \par \par \par

## 2019-09-24 NOTE — HISTORY OF PRESENT ILLNESS
[FreeTextEntry1] : Mr. St is a 73 year old male with a history of abnormal chest CT, allergic rhinitis, asbestos exposure, asthma, COPD, esophageal reflux, non-small cell cancer of left lung, JEZ, chronic rhinitis and SOB presenting to the office today for a follow up visit. His chief complaint is stress.\par -he states that he has been under a significant amount of stress due to his wife's recent hospitalization for cardiac issues\par -He states that he has generally physically been feeling well \par -he has lost some weight after his [place] at [facility] told him he gained 10 lbs in 6 months recently\par -he has been sleeping very well\par -he has been using Omeprazole 20 mg QOD to control his reflux\par -his allergy / sinus have been under control\par -he states that his bowels have been regular\par -he reports that his sense of taste and smell have been good \par -he denies any headaches, nausea, vomiting, fever, chills, sweats, chest pain, chest pressure, diarrhea, constipation, dysphagia, dizziness, leg swelling, leg pain, itchy eyes, itchy ears, heartburn, reflux, or sour taste in the mouth, wheeze, cough.

## 2019-12-12 ENCOUNTER — APPOINTMENT (OUTPATIENT)
Dept: PSYCHIATRY | Facility: CLINIC | Age: 74
End: 2019-12-12
Payer: COMMERCIAL

## 2019-12-12 DIAGNOSIS — F32.9 MAJOR DEPRESSIVE DISORDER, SINGLE EPISODE, UNSPECIFIED: ICD-10-CM

## 2019-12-12 DIAGNOSIS — F41.9 ANXIETY DISORDER, UNSPECIFIED: ICD-10-CM

## 2019-12-12 PROCEDURE — 99214 OFFICE O/P EST MOD 30 MIN: CPT

## 2019-12-17 ENCOUNTER — LABORATORY RESULT (OUTPATIENT)
Age: 74
End: 2019-12-17

## 2019-12-17 ENCOUNTER — APPOINTMENT (OUTPATIENT)
Dept: DERMATOLOGY | Facility: CLINIC | Age: 74
End: 2019-12-17
Payer: COMMERCIAL

## 2019-12-17 DIAGNOSIS — L57.0 ACTINIC KERATOSIS: ICD-10-CM

## 2019-12-17 DIAGNOSIS — L21.9 SEBORRHEIC DERMATITIS, UNSPECIFIED: ICD-10-CM

## 2019-12-17 DIAGNOSIS — Z12.83 ENCOUNTER FOR SCREENING FOR MALIGNANT NEOPLASM OF SKIN: ICD-10-CM

## 2019-12-17 DIAGNOSIS — Z85.828 PERSONAL HISTORY OF OTHER MALIGNANT NEOPLASM OF SKIN: ICD-10-CM

## 2019-12-17 DIAGNOSIS — D48.5 NEOPLASM OF UNCERTAIN BEHAVIOR OF SKIN: ICD-10-CM

## 2019-12-17 PROCEDURE — 11102 TANGNTL BX SKIN SINGLE LES: CPT

## 2019-12-17 PROCEDURE — 17000 DESTRUCT PREMALG LESION: CPT | Mod: 59

## 2019-12-17 PROCEDURE — 99214 OFFICE O/P EST MOD 30 MIN: CPT | Mod: 25

## 2019-12-17 PROCEDURE — 17003 DESTRUCT PREMALG LES 2-14: CPT

## 2019-12-17 RX ORDER — ALCLOMETASONE DIPROPIONATE 0.5 MG/G
0.05 CREAM TOPICAL
Qty: 1 | Refills: 1 | Status: ACTIVE | COMMUNITY
Start: 2019-12-17 | End: 1900-01-01

## 2020-01-06 ENCOUNTER — APPOINTMENT (OUTPATIENT)
Dept: DERMATOLOGY | Facility: CLINIC | Age: 75
End: 2020-01-06
Payer: COMMERCIAL

## 2020-01-06 ENCOUNTER — LABORATORY RESULT (OUTPATIENT)
Age: 75
End: 2020-01-06

## 2020-01-06 DIAGNOSIS — C44.529 SQUAMOUS CELL CARCINOMA OF SKIN OF OTHER PART OF TRUNK: ICD-10-CM

## 2020-01-06 PROCEDURE — 11623 EXC S/N/H/F/G MAL+MRG 2.1-3: CPT

## 2020-01-06 PROCEDURE — 12042 INTMD RPR N-HF/GENIT2.6-7.5: CPT

## 2020-01-29 ENCOUNTER — NON-APPOINTMENT (OUTPATIENT)
Age: 75
End: 2020-01-29

## 2020-01-29 ENCOUNTER — APPOINTMENT (OUTPATIENT)
Dept: PULMONOLOGY | Facility: CLINIC | Age: 75
End: 2020-01-29
Payer: MEDICARE

## 2020-01-29 VITALS
HEART RATE: 69 BPM | BODY MASS INDEX: 29.25 KG/M2 | RESPIRATION RATE: 17 BRPM | WEIGHT: 182 LBS | HEIGHT: 66 IN | SYSTOLIC BLOOD PRESSURE: 146 MMHG | OXYGEN SATURATION: 98 % | DIASTOLIC BLOOD PRESSURE: 72 MMHG

## 2020-01-29 PROCEDURE — 99214 OFFICE O/P EST MOD 30 MIN: CPT | Mod: 25

## 2020-01-29 PROCEDURE — ZZZZZ: CPT

## 2020-01-29 PROCEDURE — 94010 BREATHING CAPACITY TEST: CPT

## 2020-01-29 NOTE — ASSESSMENT
[FreeTextEntry1] : Mr. St has a history of allergy, GERD, OSAS, asthma, HTN, and is s/p lung resection c/w primary lung cancer (12/2016), likely asbestos related. He is currently stable from a pulmonary perspective. - (relocating to [place]) \par \par His SOB is multifactorial:\par -overweight \par -asthma \par -COPD \par -asbestos exposure \par -cardiac disease \par -poor mechanics of breathing\par -s/p lobectomy \par \par Problem 1: lung cancer -(12/2016)\par -s/p lung resection c/w primary lung cancer\par -likely asbestos related\par -f/u chest CT 4 times/year x2 years, 2 times/year x3 years (due now, then 3-4 months) \par -f/u chest CT 5/2020 \par \par Problem 2: asthma- (stable) \par -continue Albuterol via nebulizer Q6H\par -continue Advair 250 1 inhalation BID \par -continue Combivent PRN Q6H\par -continue Singulair 10 mg QD at bed\par \par -Inhaler technique reviewed as well as oral hygiene techniques reviewed with patient. Avoidance of cold air, extremes of temperature, rescue inhaler should be used before exercise. Order of medication reviewed with patient. Recommended use of a cool mist humidifier in the bedroom. \par -Asthma is believed to be caused by inherited (genetic) and environmental factor, but its exact cause is unknown. Asthma may be triggered by allergens, lung infections, or irritants in the air. Asthma triggers are different for each person.\par \par Problem 3: GERD\par -continue Omeprazole 20 mg QOD in the morning \par -Things to avoid including overeating, spicy foods, tight clothing, eating within three hours of bed, this list is not all inclusive. \par -For treatment of reflux, possible options discussed including diet control, H2 blockers, PPIs, as well as coating motility agents discussed as treatment options. Timing of meals and proximity of last meal to sleep were discussed. If symptoms persist, a formal gastrointestinal evaluation is needed.\par \par Problem 4: allergy/sinus\par -continue Clarinex 5 mg QHS\par -continue olopatadine 0.6% 1 sniff each nostril BID\par Environmental measures for allergies were encouraged including mattress and pillow cover, air purifier, and environmental controls.\par \par Problem 5: overweight -(needs improvement)\par -Weight loss, exercise, and diet control were discussed and are highly encouraged. Treatment options were given such as, aqua therapy, and contacting a nutritionist. Recommended to use the elliptical, stationary bike, less use of treadmill.  Obesity is associated with worsening asthma, shortness of breath, and potential for cardiac disease, diabetes, and other underlying medical conditions. \par \par Problem 6: OSAS\par -recommended to use "Oxy Aid" \par -Oral appliance fitting due him not tolerating the face mask. F/u sleep study with the appliance (Home) \par -continue Provigil 200 mg QD (ISTOP Checked)\par -Discussed the risks/associations with coronary artery disease, atrial fibrillation, arrhythmia, memory loss, issues with concentration, stroke risk, hypertension, nocturia, chronic reflux/Bray’s esophagus some but not all inclusive. Treatment options discussed including CPAP/BiPAP machine, oral appliance, ProVent therapy, Oxy-Aid by [employer], new technologies, or positional sleep. \par \par Problem 7: health maintenance\par -received influenza vaccine 2019\par -recommended strep pneumonia vaccines: Prevnar-13 vaccine, followed by Pneumo vaccine 23 on year following\par -recommended early intervention for URIs\par -recommended osteoporosis evaluations\par -recommended early dermatological evaluations\par -recommended after the age of 50 to the age of 70, colonoscopy every 5 years\par \par Follow up in 3-4 months - SPI / DLCO \par The patient was encouraged to call with any changes, concerns, or questions.

## 2020-01-29 NOTE — PHYSICAL EXAM
[General Appearance - Well Developed] : well developed [Normal Appearance] : normal appearance [Well Groomed] : well groomed [General Appearance - Well Nourished] : well nourished [General Appearance - In No Acute Distress] : no acute distress [No Deformities] : no deformities [Eyelids - No Xanthelasma] : the eyelids demonstrated no xanthelasmas [Normal Conjunctiva] : the conjunctiva exhibited no abnormalities [Normal Oropharynx] : normal oropharynx [Neck Appearance] : the appearance of the neck was normal [II] : II [Thyroid Diffuse Enlargement] : the thyroid was not enlarged [Jugular Venous Distention Increased] : there was no jugular-venous distention [Neck Cervical Mass (___cm)] : no neck mass was observed [Thyroid Nodule] : there were no palpable thyroid nodules [Heart Rate And Rhythm] : heart rate and rhythm were normal [Heart Sounds] : normal S1 and S2 [Murmurs] : no murmurs present [Exaggerated Use Of Accessory Muscles For Inspiration] : no accessory muscle use [Auscultation Breath Sounds / Voice Sounds] : lungs were clear to auscultation bilaterally [Respiration, Rhythm And Depth] : normal respiratory rhythm and effort [Abdomen Soft] : soft [Abdomen Tenderness] : non-tender [Abdomen Mass (___ Cm)] : no abdominal mass palpated [Abnormal Walk] : normal gait [Gait - Sufficient For Exercise Testing] : the gait was sufficient for exercise testing [Cyanosis, Localized] : no localized cyanosis [Nail Clubbing] : no clubbing of the fingernails [Petechial Hemorrhages (___cm)] : no petechial hemorrhages [No Venous Stasis] : no venous stasis [] : no rash [Skin Color & Pigmentation] : normal skin color and pigmentation [No Skin Ulcers] : no skin ulcer [No Xanthoma] : no  xanthoma was observed [Skin Lesions] : no skin lesions [Deep Tendon Reflexes (DTR)] : deep tendon reflexes were 2+ and symmetric [Sensation] : the sensory exam was normal to light touch and pinprick [No Focal Deficits] : no focal deficits [Impaired Insight] : insight and judgment were intact [Oriented To Time, Place, And Person] : oriented to person, place, and time [Affect] : the affect was normal [FreeTextEntry1] : I:E ratio 1:3; clear

## 2020-01-29 NOTE — PROCEDURE
[FreeTextEntry1] : Full PFT reveals mildly obstructive flows; FEV1 was 2.20L which is 76% of predicted with 8 % improvement with use of bronchodilator at mid-low lung volume ; normal lung volumes; normal diffusion at 18.8, which is 94% of predicted; normal flow volume loop

## 2020-01-29 NOTE — HISTORY OF PRESENT ILLNESS
[FreeTextEntry1] : Mr. St is a 73 year old male with a history of abnormal chest CT, allergic rhinitis, asbestos exposure, asthma, COPD, esophageal reflux, non-small cell cancer of left lung, JEZ, chronic rhinitis and SOB presenting to the office today for a follow up visit. His chief complaint is relocating to [place] \par -he notes that he has been feeling well. \par -he notes constant headaches. \par -no fevers or chills. \par -he notes that His bowels are regular.  \par -reports having reflux - a little bit; noticed last night after he ate from outside. \par -he notes that he is sleeping well with 6-7 hours of sleep.\par -he notes that he does not snore. \par -He denies any headaches, nausea, vomiting, fever, chills, sweats, chest pain, chest pressure, palpitations, SOB, coughing, wheezing, fatigue, diarrhea, constipation, dysphagia, myalgias, dizziness, leg swelling, leg pain, itchy eyes, itchy ears, heartburn, reflux, or sour taste in the mouth.

## 2020-01-29 NOTE — ADDENDUM
[FreeTextEntry1] : Documented by Arpit Gunderson acting as a scribe for Dr. Vitor Mratell on 01/29/2020 \par \par All medical record entries made by the Scribe were at my, Dr. Vitor Martell's, direction and personally dictated by me on 01/29/2020 . I have reviewed the chart and agree that the record accurately reflects my personal performance of the history, physical exam, assessment and plan. I have also personally directed, reviewed, and agree with the discharge instructions. \par

## 2020-02-10 ENCOUNTER — FORM ENCOUNTER (OUTPATIENT)
Age: 75
End: 2020-02-10

## 2020-02-13 ENCOUNTER — FORM ENCOUNTER (OUTPATIENT)
Age: 75
End: 2020-02-13

## 2020-02-29 ENCOUNTER — RX RENEWAL (OUTPATIENT)
Age: 75
End: 2020-02-29

## 2020-03-10 ENCOUNTER — APPOINTMENT (OUTPATIENT)
Dept: GASTROENTEROLOGY | Facility: CLINIC | Age: 75
End: 2020-03-10

## 2020-05-13 ENCOUNTER — APPOINTMENT (OUTPATIENT)
Dept: OTHER | Facility: CLINIC | Age: 75
End: 2020-05-13
Payer: COMMERCIAL

## 2020-05-13 PROCEDURE — 99396 PREV VISIT EST AGE 40-64: CPT | Mod: 95

## 2020-05-13 PROCEDURE — 99442: CPT

## 2020-05-13 PROCEDURE — 99397 PER PM REEVAL EST PAT 65+ YR: CPT

## 2020-05-13 RX ORDER — KETOCONAZOLE 20 MG/G
2 CREAM TOPICAL
Qty: 1 | Refills: 1 | Status: ACTIVE | COMMUNITY
Start: 2019-06-17 | End: 1900-01-01

## 2020-06-11 ENCOUNTER — APPOINTMENT (OUTPATIENT)
Dept: PULMONOLOGY | Facility: CLINIC | Age: 75
End: 2020-06-11
Payer: MEDICARE

## 2020-06-11 PROCEDURE — 99214 OFFICE O/P EST MOD 30 MIN: CPT | Mod: 95

## 2020-06-11 RX ORDER — ROSUVASTATIN CALCIUM 40 MG/1
40 TABLET, FILM COATED ORAL
Qty: 90 | Refills: 1 | Status: ACTIVE | COMMUNITY
Start: 2020-06-11 | End: 1900-01-01

## 2020-06-11 NOTE — ASSESSMENT
[FreeTextEntry1] : Mr. St is a 74 year old male who has a history of allergy, HTN, GERD, OSAS, asthma, HTN, and is s/p lung resection c/w primary lung cancer (12/2016), likely asbestos related. He videos calls into the office for a pulmonary follow up. He is currently stable from a pulmonary perspective. - (relocated to [place]) \par \par His SOB is multifactorial:\par -overweight \par -asthma \par -COPD \par -asbestos exposure \par -cardiac disease \par -poor mechanics of breathing\par -s/p lobectomy \par \par Problem 1: lung cancer -(12/2016) (likely Asbestos related)\par -s/p lung resection c/w primary lung cancer\par -likely asbestos related\par -f/u chest CT 4 times/year x2 years, 2 times/year x3 years (due now, then 3-4 months) \par -f/u chest CT 5/2020  (Due)\par \par Problem 2: asthma- (stable) \par -continue Albuterol via nebulizer Q6H\par -continue Advair 250 1 inhalation BID \par -continue Combivent PRN Q6H\par -continue Singulair 10 mg QD at bed\par \par -Inhaler technique reviewed as well as oral hygiene techniques reviewed with patient. Avoidance of cold air, extremes of temperature, rescue inhaler should be used before exercise. Order of medication reviewed with patient. Recommended use of a cool mist humidifier in the bedroom. \par -Asthma is believed to be caused by inherited (genetic) and environmental factor, but its exact cause is unknown. Asthma may be triggered by allergens, lung infections, or irritants in the air. Asthma triggers are different for each person.\par \par Problem 3: GERD\par -continue Omeprazole 20 mg QOD in the morning \par -Things to avoid including overeating, spicy foods, tight clothing, eating within three hours of bed, this list is not all inclusive. \par -For treatment of reflux, possible options discussed including diet control, H2 blockers, PPIs, as well as coating motility agents discussed as treatment options. Timing of meals and proximity of last meal to sleep were discussed. If symptoms persist, a formal gastrointestinal evaluation is needed.\par \par Problem 4: allergy/sinus (stable)\par -continue Clarinex 5 mg QHS\par -continue olopatadine 0.6% 1 sniff each nostril BID\par Environmental measures for allergies were encouraged including mattress and pillow cover, air purifier, and environmental controls.\par \par Problem 5: overweight -(needs improvement)\par -Weight loss, exercise, and diet control were discussed and are highly encouraged. Treatment options were given such as, aqua therapy, and contacting a nutritionist. Recommended to use the elliptical, stationary bike, less use of treadmill.  Obesity is associated with worsening asthma, shortness of breath, and potential for cardiac disease, diabetes, and other underlying medical conditions. \par \par Problem 6: OSAS\par -recommended to use "Oxy Aid" \par -Oral appliance fitting due him not tolerating the face mask. F/u sleep study with the appliance (Home) \par -continue Provigil 200 mg QD (ISTOP Checked)\par -Discussed the risks/associations with coronary artery disease, atrial fibrillation, arrhythmia, memory loss, issues with concentration, stroke risk, hypertension, nocturia, chronic reflux/Bray’s esophagus some but not all inclusive. Treatment options discussed including CPAP/BiPAP machine, oral appliance, ProVent therapy, Oxy-Aid by [employer], new technologies, or positional sleep. \par \par Problem 7: Health Maintenance/COVID19 Precautions:\par - Clean your hands often. Wash your hands often with soap and water for at least 20 seconds, especially after blowing your nose, coughing, or sneezing, or having been in a public place.\par - If soap and water are not available, use a hand  that contains at least 60% alcohol.\par - To the extent possible, avoid touching high-touch surfaces in public places - elevator buttons, door handles, handrails, handshaking with people, etc. Use a tissue or your sleeve to cover your hand or finger if you must touch something.\par - Wash your hands after touching surfaces in public places.\par - Avoid touching your face, nose, eyes, etc.\par - Clean and disinfect your home to remove germs: practice routine cleaning of frequently touched surfaces (for example: tables, doorknobs, light switches, handles, desks, toilets, faucets, sinks & cell phones)\par - Avoid crowds, especially in poorly ventilated spaces. Your risk of exposure to respiratory viruses like COVID-19 may increase in crowded, closed-in settings with little air circulation if there are people in the crowd who are sick. All patients are recommended to practice social distancing and stay at least 6 feet away from others.\par - Avoid all non-essential travel including plane trips, and especially avoid embarking on cruise ships.\par -If COVID-19 is spreading in your community, take extra measures to put distance between yourself and other people to further reduce your risk of being exposed to this new virus.\par -Stay home as much as possible.\par - Consider ways of getting food brought to your house through family, social, or commercial networks\par -Be aware that the virus has been known to live in the air up to 3 hours post exposure, cardboard up to 24 hours post exposure, copper up to 4 hours post exposure, steel and plastic up to 2-3 days post exposure. Risk of transmission from these surfaces are affected by many variables.\par Immune Support Recommendations:\par -OTC Vitamin C 500mg BID \par -OTC Quercetin 250-500mg BID \par -OTC Zinc 75-100mg per day \par -OTC Melatonin 1 or 2 mg a night \par -OTC Vitamin D 1-4000mg per day \par -OTC Tonic Water 8oz per day\par Asthma and COVID19:\par You need to make sure your asthma is under control. This often requires the use of inhaled corticosteroids (and sometimes oral corticosteroids). Inhaled corticosteroids do not likely reduce your immune system’s ability to fight infections, but oral corticosteroids may. It is important to use the steps above to protect yourself to limit your exposure to any respiratory virus. \par \par Problem 8: health maintenance\par -received influenza vaccine 2019\par -recommended strep pneumonia vaccines: Prevnar-13 vaccine, followed by Pneumo vaccine 23 on year following\par -recommended early intervention for URIs\par -recommended osteoporosis evaluations\par -recommended early dermatological evaluations\par -recommended after the age of 50 to the age of 70, colonoscopy every 5 years\par \par Follow up in 3-4 months - SPI / DLCO \par The patient was encouraged to call with any changes, concerns, or questions.

## 2020-06-11 NOTE — HISTORY OF PRESENT ILLNESS
[Home] : at home, [unfilled] , at the time of the visit. [Medical Office: ([facility])___] : at the medical office located in  [Verbal consent obtained from patient] : the patient, [unfilled] [FreeTextEntry1] : Mr. St is a 74 year old male with a history of abnormal chest CT, allergic rhinitis, asbestos exposure, asthma, COPD, esophageal reflux, non-small cell cancer of left lung, JEZ, chronic rhinitis and SOB video calling to the office today via video call for a follow up visit. His chief complaint is \par \par -he notes sleeping well\par -he note 8 hours of sleep on average\par -he notes wife has recovered and is doing well\par -he notes weight is stable\par -he notes exercising doing work around the house\par -he notes currently in [place]\par -he notes sleep is restful\par -he denies any allergic flairs\par -he notes weight is stable\par -he denies palpitations\par -he denies visual issues\par -he notes noncompliance with follow up CT due to COVID 19 pandemic and currently in [place]\par -he denies any new medications, vitamins, or supplements\par \par -he denies any chest pain, chest pressure, diarrhea, constipation, dysphagia, dizziness, sour taste in the mouth, leg swelling, leg pain, itchy eyes, itchy ears, heartburn, reflux, myalgias or arthralgias.\par

## 2020-06-11 NOTE — PROCEDURE
[FreeTextEntry1] : CT Chest (5.2.2019) reveals  since October 18, 2018, no change without recurrent or metastatic disease.

## 2020-06-11 NOTE — REASON FOR VISIT
[Follow-Up] : a follow-up visit [FreeTextEntry1] : video call-abnormal chest CT, allergic rhinitis, asbestos exposure, asthma, COPD, esophageal reflux, non-small cell cancer of left lung, JEZ, chronic rhinitis and SOB

## 2020-06-11 NOTE — PHYSICAL EXAM
[Well Nourished] : well nourished [No Acute Distress] : no acute distress [Well Groomed] : well groomed [Normal Appearance] : normal appearance [Well Developed] : well developed [No Deformities] : no deformities

## 2020-06-11 NOTE — ADDENDUM
[FreeTextEntry1] : Documented by Mateus Drake acting as a scribe for Dr. Vitor Martell on 06/11/2020.\par \par All medical record entries made by the Scribe were at my, Dr. Vitor Martell's, direction and personally dictated by me on 06/11/2020. I have reviewed the chart and agree that the record accurately reflects my personal performance of the history, physical exam, assessment and plan. I have also personally directed, reviewed, and agree with the discharge instructions.

## 2020-09-08 ENCOUNTER — RX RENEWAL (OUTPATIENT)
Age: 75
End: 2020-09-08

## 2020-09-09 ENCOUNTER — RX RENEWAL (OUTPATIENT)
Age: 75
End: 2020-09-09

## 2020-11-03 ENCOUNTER — RX RENEWAL (OUTPATIENT)
Age: 75
End: 2020-11-03

## 2020-11-06 ENCOUNTER — NON-APPOINTMENT (OUTPATIENT)
Age: 75
End: 2020-11-06

## 2020-12-14 ENCOUNTER — RX RENEWAL (OUTPATIENT)
Age: 75
End: 2020-12-14

## 2020-12-14 RX ORDER — IPRATROPIUM BROMIDE AND ALBUTEROL 20; 100 UG/1; UG/1
20-100 SPRAY, METERED RESPIRATORY (INHALATION)
Qty: 4 | Refills: 0 | Status: ACTIVE | COMMUNITY
Start: 2018-04-13 | End: 1900-01-01

## 2020-12-14 RX ORDER — TIOTROPIUM BROMIDE INHALATION SPRAY 3.12 UG/1
2.5 SPRAY, METERED RESPIRATORY (INHALATION)
Qty: 12 | Refills: 1 | Status: ACTIVE | COMMUNITY
Start: 2018-07-17 | End: 1900-01-01

## 2020-12-21 ENCOUNTER — APPOINTMENT (OUTPATIENT)
Dept: PULMONOLOGY | Facility: CLINIC | Age: 75
End: 2020-12-21
Payer: MEDICARE

## 2020-12-21 VITALS — HEIGHT: 66 IN | WEIGHT: 181 LBS | BODY MASS INDEX: 29.09 KG/M2

## 2020-12-21 DIAGNOSIS — R93.89 ABNORMAL FINDINGS ON DIAGNOSTIC IMAGING OF OTHER SPECIFIED BODY STRUCTURES: ICD-10-CM

## 2020-12-21 DIAGNOSIS — R06.02 SHORTNESS OF BREATH: ICD-10-CM

## 2020-12-21 DIAGNOSIS — J45.909 UNSPECIFIED ASTHMA, UNCOMPLICATED: ICD-10-CM

## 2020-12-21 DIAGNOSIS — Z71.89 OTHER SPECIFIED COUNSELING: ICD-10-CM

## 2020-12-21 DIAGNOSIS — Z77.090 CONTACT WITH AND (SUSPECTED) EXPOSURE TO ASBESTOS: ICD-10-CM

## 2020-12-21 DIAGNOSIS — J30.9 ALLERGIC RHINITIS, UNSPECIFIED: ICD-10-CM

## 2020-12-21 PROCEDURE — 99214 OFFICE O/P EST MOD 30 MIN: CPT | Mod: 95

## 2020-12-21 RX ORDER — TORSEMIDE 20 MG/1
20 TABLET ORAL
Qty: 90 | Refills: 0 | Status: ACTIVE | COMMUNITY
Start: 2020-09-30

## 2020-12-21 RX ORDER — ALBUTEROL SULFATE 90 UG/1
108 (90 BASE) INHALANT RESPIRATORY (INHALATION)
Qty: 3 | Refills: 2 | Status: ACTIVE | COMMUNITY
Start: 2020-12-21 | End: 1900-01-01

## 2020-12-21 RX ORDER — BEMPEDOIC ACID AND EZETIMIBE 180; 10 MG/1; MG/1
180-10 TABLET, FILM COATED ORAL
Qty: 90 | Refills: 0 | Status: ACTIVE | COMMUNITY
Start: 2020-10-31

## 2020-12-21 NOTE — HISTORY OF PRESENT ILLNESS
[Home] : at home, [unfilled] , at the time of the visit. [Medical Office: ([facility])___] : at the medical office located in  [Verbal consent obtained from patient] : the patient, [unfilled] [FreeTextEntry1] : Mr. tS is a 75 year old male with a history of abnormal chest CT, allergic rhinitis, asbestos exposure, asthma, COPD, esophageal reflux, non-small cell cancer of left lung, JEZ, chronic rhinitis and SOB video calling to the office today via video call for a follow up visit. His chief complaint is \par \par -he notes generally feeling well\par -he notes intermittent diarrhea\par -he notes weight stable, around 180 lbs\par -he notes regular bowel movements \par -he notes exercising walking, but wants to do more\par -he notes intermittent ankle swelling, controlled with cardiology Rx\par -he notes intermittent vocal hoarseness onset 9/2020 exacerbated by allergies\par -he denies leaky, drippy, congested sinuses\par -he denies taking any new medications, vitamins, or supplements\par \par -denies any headaches, nausea, vomiting, fever, chills, sweats, chest pain, chest pressure, constipation, dysphagia, sour taste in the mouth, dizziness, leg swelling, leg pain, myalgias, arthralgias, itchy eyes, itchy ears, heartburn, or reflux.\par \par

## 2020-12-21 NOTE — ASSESSMENT
[FreeTextEntry1] : Mr. St is a 75 year old male who has a history of allergy, HTN, GERD, OSAS, asthma, HTN, and is s/p lung resection c/w primary lung cancer (12/2016), likely asbestos related. He videos calls into the office for a pulmonary follow up. He is currently stable from a pulmonary perspective. - (relocated to [place]) \par \par His SOB is multifactorial:\par -overweight \par -asthma \par -COPD \par -asbestos exposure \par -cardiac disease \par -poor mechanics of breathing\par -s/p lobectomy \par \par Problem 1: lung cancer -(12/2016) (likely Asbestos related)\par -s/p lung resection c/w primary lung cancer\par -likely asbestos related\par -f/u chest CT 4 times/year x2 years, 2 times/year x3 years \par -f/u chest CT (last 8/2020, next 2/2021)\par \par Problem 2: asthma- (stable) \par -continue Albuterol via nebulizer Q6H\par -continue Advair 250 1 inhalation BID \par -continue Combivent PRN Q6H\par -continue Singulair 10 mg QD at bed\par \par -Inhaler technique reviewed as well as oral hygiene techniques reviewed with patient. Avoidance of cold air, extremes of temperature, rescue inhaler should be used before exercise. Order of medication reviewed with patient. Recommended use of a cool mist humidifier in the bedroom. \par -Asthma is believed to be caused by inherited (genetic) and environmental factor, but its exact cause is unknown. Asthma may be triggered by allergens, lung infections, or irritants in the air. Asthma triggers are different for each person.\par \par Problem 3: GERD\par -continue Omeprazole 20 mg QOD in the morning \par -Things to avoid including overeating, spicy foods, tight clothing, eating within three hours of bed, this list is not all inclusive. \par -For treatment of reflux, possible options discussed including diet control, H2 blockers, PPIs, as well as coating motility agents discussed as treatment options. Timing of meals and proximity of last meal to sleep were discussed. If symptoms persist, a formal gastrointestinal evaluation is needed.\par \par Problem 4: allergy/sinus (stable)\par -continue Clarinex 5 mg QHS\par -continue olopatadine 0.6% 1 sniff each nostril BID\par Environmental measures for allergies were encouraged including mattress and pillow cover, air purifier, and environmental controls.\par \par Problem 5: overweight -(needs improvement)\par -Weight loss, exercise, and diet control were discussed and are highly encouraged. Treatment options were given such as, aqua therapy, and contacting a nutritionist. Recommended to use the elliptical, stationary bike, less use of treadmill.  Obesity is associated with worsening asthma, shortness of breath, and potential for cardiac disease, diabetes, and other underlying medical conditions. \par \par Problem 6: OSAS\par -recommended to use "Oxy Aid" \par -Oral appliance fitting due him not tolerating the face mask. F/u sleep study with the appliance (Home) \par -continue Provigil 200 mg QD (ISTOP Checked)\par -Discussed the risks/associations with coronary artery disease, atrial fibrillation, arrhythmia, memory loss, issues with concentration, stroke risk, hypertension, nocturia, chronic reflux/Bray’s esophagus some but not all inclusive. Treatment options discussed including CPAP/BiPAP machine, oral appliance, ProVent therapy, Oxy-Aid by [employer], new technologies, or positional sleep. \par \par Problem 7: Health Maintenance/COVID19 Precautions:\par - Clean your hands often. Wash your hands often with soap and water for at least 20 seconds, especially after blowing your nose, coughing, or sneezing, or having been in a public place.\par - If soap and water are not available, use a hand  that contains at least 60% alcohol.\par - To the extent possible, avoid touching high-touch surfaces in public places - elevator buttons, door handles, handrails, handshaking with people, etc. Use a tissue or your sleeve to cover your hand or finger if you must touch something.\par - Wash your hands after touching surfaces in public places.\par - Avoid touching your face, nose, eyes, etc.\par - Clean and disinfect your home to remove germs: practice routine cleaning of frequently touched surfaces (for example: tables, doorknobs, light switches, handles, desks, toilets, faucets, sinks & cell phones)\par - Avoid crowds, especially in poorly ventilated spaces. Your risk of exposure to respiratory viruses like COVID-19 may increase in crowded, closed-in settings with little air circulation if there are people in the crowd who are sick. All patients are recommended to practice social distancing and stay at least 6 feet away from others.\par - Avoid all non-essential travel including plane trips, and especially avoid embarking on cruise ships.\par -If COVID-19 is spreading in your community, take extra measures to put distance between yourself and other people to further reduce your risk of being exposed to this new virus.\par -Stay home as much as possible.\par - Consider ways of getting food brought to your house through family, social, or commercial networks\par -Be aware that the virus has been known to live in the air up to 3 hours post exposure, cardboard up to 24 hours post exposure, copper up to 4 hours post exposure, steel and plastic up to 2-3 days post exposure. Risk of transmission from these surfaces are affected by many variables.\par Immune Support Recommendations:\par -OTC Vitamin C 500mg BID \par -OTC Quercetin 250-500mg BID \par -OTC Zinc 75-100mg per day \par -OTC Melatonin 1 or 2 mg a night \par -OTC Vitamin D 1-4000mg per day \par -OTC Tonic Water 8oz per day\par Asthma and COVID19:\par You need to make sure your asthma is under control. This often requires the use of inhaled corticosteroids (and sometimes oral corticosteroids). Inhaled corticosteroids do not likely reduce your immune system’s ability to fight infections, but oral corticosteroids may. It is important to use the steps above to protect yourself to limit your exposure to any respiratory virus. \par \par Problem 8: health maintenance\par -received influenza vaccine 2019 (2020 pending) \par -recommended strep pneumonia vaccines: Prevnar-13 vaccine, followed by Pneumo vaccine 23 on year following\par -recommended early intervention for URIs\par -recommended osteoporosis evaluations\par -recommended early dermatological evaluations\par -recommended after the age of 50 to the age of 70, colonoscopy every 5 years\par \par Follow up in 3-4 months - SPI / DLCO \par The patient was encouraged to call with any changes, concerns, or questions. Statement Selected

## 2020-12-21 NOTE — ADDENDUM
[FreeTextEntry1] : Documented by Mateus Drake acting as a scribe for Dr. Vitor Martell on 12/21/2020.\par \par All medical record entries made by the Scribe were at my, Dr. Vitor Martell's, direction and personally dictated by me on 12/21/2020 . I have reviewed the chart and agree that the record accurately reflects my personal performance of the history, physical exam, assessment and plan. I have also personally directed, reviewed, and agree with the discharge instructions. \par

## 2021-05-12 ENCOUNTER — APPOINTMENT (OUTPATIENT)
Dept: OTHER | Facility: CLINIC | Age: 76
End: 2021-05-12
Payer: COMMERCIAL

## 2021-05-12 DIAGNOSIS — Z04.9 ENCOUNTER FOR EXAMINATION AND OBSERVATION FOR UNSPECIFIED REASON: ICD-10-CM

## 2021-05-12 DIAGNOSIS — C44.329 SQUAMOUS CELL CARCINOMA OF SKIN OF OTHER PARTS OF FACE: ICD-10-CM

## 2021-05-12 DIAGNOSIS — G47.33 OBSTRUCTIVE SLEEP APNEA (ADULT) (PEDIATRIC): ICD-10-CM

## 2021-05-12 DIAGNOSIS — K21.9 GASTRO-ESOPHAGEAL REFLUX DISEASE W/OUT ESOPHAGITIS: ICD-10-CM

## 2021-05-12 DIAGNOSIS — J44.9 CHRONIC OBSTRUCTIVE PULMONARY DISEASE, UNSPECIFIED: ICD-10-CM

## 2021-05-12 DIAGNOSIS — J31.0 CHRONIC RHINITIS: ICD-10-CM

## 2021-05-12 PROCEDURE — 99397 PER PM REEVAL EST PAT 65+ YR: CPT | Mod: 95

## 2021-05-12 PROCEDURE — 99442: CPT | Mod: 95

## 2021-05-12 RX ORDER — BUSPIRONE HYDROCHLORIDE 30 MG/1
30 TABLET ORAL TWICE DAILY
Refills: 0 | Status: ACTIVE | COMMUNITY
Start: 2020-09-30

## 2021-05-12 RX ORDER — ATENOLOL 50 MG/1
50 TABLET ORAL TWICE DAILY
Refills: 0 | Status: ACTIVE | COMMUNITY
Start: 2020-10-31

## 2021-05-12 RX ORDER — IPRATROPIUM BROMIDE AND ALBUTEROL 20; 100 UG/1; UG/1
20-100 SPRAY, METERED RESPIRATORY (INHALATION)
Qty: 3 | Refills: 1 | Status: COMPLETED | COMMUNITY
Start: 2020-06-11 | End: 2021-05-12

## 2021-05-12 RX ORDER — MODAFINIL 200 MG/1
200 TABLET ORAL
Qty: 30 | Refills: 5 | Status: COMPLETED | COMMUNITY
Start: 2019-02-25 | End: 2021-05-12

## 2021-05-12 RX ORDER — SERTRALINE HYDROCHLORIDE 100 MG/1
100 TABLET, FILM COATED ORAL
Refills: 0 | Status: ACTIVE | COMMUNITY

## 2021-05-12 RX ORDER — MONTELUKAST 10 MG/1
10 TABLET, FILM COATED ORAL
Qty: 90 | Refills: 1 | Status: COMPLETED | COMMUNITY
Start: 2017-10-09 | End: 2021-05-12

## 2021-05-12 RX ORDER — SPIRONOLACTONE 25 MG/1
25 TABLET ORAL
Refills: 0 | Status: ACTIVE | COMMUNITY
Start: 2020-09-30

## 2021-05-12 RX ORDER — LOSARTAN POTASSIUM 50 MG/1
50 TABLET, FILM COATED ORAL
Refills: 0 | Status: COMPLETED | COMMUNITY
Start: 2019-01-05 | End: 2021-05-12

## 2021-05-12 RX ORDER — TIOTROPIUM BROMIDE INHALATION SPRAY 3.12 UG/1
2.5 SPRAY, METERED RESPIRATORY (INHALATION) DAILY
Qty: 3 | Refills: 1 | Status: COMPLETED | COMMUNITY
Start: 2020-06-11 | End: 2021-05-12

## 2021-05-12 RX ORDER — ROSUVASTATIN CALCIUM 40 MG/1
40 TABLET, FILM COATED ORAL
Refills: 0 | Status: COMPLETED | COMMUNITY
End: 2021-05-12

## 2021-05-12 RX ORDER — CARVEDILOL 25 MG/1
25 TABLET, FILM COATED ORAL
Refills: 0 | Status: COMPLETED | COMMUNITY
End: 2021-05-12

## 2021-05-12 RX ORDER — BECLOMETHASONE DIPROPIONATE 80 UG/1
80 AEROSOL, METERED NASAL
Qty: 3 | Refills: 1 | Status: COMPLETED | COMMUNITY
Start: 2020-06-11 | End: 2021-05-12

## 2021-07-27 ENCOUNTER — RX RENEWAL (OUTPATIENT)
Age: 76
End: 2021-07-27

## 2021-07-27 RX ORDER — BECLOMETHASONE DIPROPIONATE 80 UG/1
80 AEROSOL, METERED NASAL
Qty: 31.8 | Refills: 0 | Status: ACTIVE | COMMUNITY
Start: 2018-12-14 | End: 1900-01-01

## 2021-10-28 NOTE — H&P PST ADULT - NSWEIGHTCALCTOOLDRUG_GEN_A_CORE
[Hearing Loss] : hearing loss [Otalgia] : otalgia [Presbycusis] : presbycusis [None] : No risk factors have been identified. [de-identified] : 77 yo male\par Patient states he went to get hearing aids, as he was getting fitted for the right ear he felt severe right sided ear pain. He did get his hearing aids but not hearing well from it. Complains of nasal congestion, cant breath from the left nostril. Pt has no ear pain, ear drainage, tinnitus, vertigo, nasal discharge, epistaxis, sinus infections, facial pain, facial pressure, throat pain, dysphagia or fevers\par \par  [FreeTextEntry1] : 10/28/2021\par Patient presents for ear cleaning. Last visit ear cleaning was uncomfortable for pt. He used Debrox daily. No other modifying factors\par   [Early Onset Hearing Loss] : no early onset hearing loss [Stroke] : no stroke [Allergic Rhinitis] : no allergic rhinitis [Adenoidectomy] : no adenoidectomy [Allergies] : no allergies [Asthma] : no asthma [Hyperthyroidism] : no hyperthyroidism  used [Sialadenitis] : no sialadenitis [Hodgkin Disease] : no hodgkin disease [Non-Hodgkin Lymphoma] : no non-hodgkin lymphoma [Graves Disease] : no graves disease [Thyroid Cancer] : no thyroid cancer

## 2024-06-17 NOTE — H&P PST ADULT - NSANTHTOTALSCORECAL_ENT_A_CORE
Action 3: Continue Hide Aquaphor Products: No Samples Given: Cerave cleanser Detail Level: Zone Plan: AM  \\nWash face with BPO \\nApply thin layer of clindamycin lotion to face for breakouts \\nMoisturize with spf greater than 30  \\n\\nPM Wash face with gentle cleanser  \\nWash back with BPO in shower and apply clindamycin lotion after \\nApply pea-sized amount of tretinoin to face x3 days a week \\nMoisturize well. 3

## 2025-01-22 NOTE — H&P PST ADULT - PROBLEM SELECTOR PROBLEM 4
Detail Level: Detailed Add 82441 Cpt? (Important Note: In 2017 The Use Of 94108 Is Being Tracked By Cms To Determine Future Global Period Reimbursement For Global Periods): no Wound Evaluated By: Jeanette Daily M.D., FAAD. Wheezing

## 2025-05-27 NOTE — H&P PST ADULT - DOCUMENT STATUS
Botox Price Per Unit: 13 Voluma Price Per Syringe: 500 Detail Level: Simple Misc Procedure Description: $25 for test spot treated today Notice: We have created a more complete Cosmetic Quote plan.  The procedure name is also Cosmetic Quote.  Please review the new plan and hide the Cosmetic Quote plan you do not want to use. Dysport Price Per Unit: 4 Xeomin Price Per Unit: 15 Discount Percentage: 0 Authored by Resident/PA/NP